# Patient Record
Sex: FEMALE | Race: WHITE | URBAN - METROPOLITAN AREA
[De-identification: names, ages, dates, MRNs, and addresses within clinical notes are randomized per-mention and may not be internally consistent; named-entity substitution may affect disease eponyms.]

---

## 2024-03-24 LAB
AMB EXT HIV AG AB COMBO: NEGATIVE
AMB EXT TSH: 1.23 MIU/ML

## 2024-06-05 ENCOUNTER — OFFICE VISIT (OUTPATIENT)
Dept: OBGYN CLINIC | Facility: CLINIC | Age: 34
End: 2024-06-05

## 2024-06-05 DIAGNOSIS — Z71.89 PRENATAL CONSULT: Primary | ICD-10-CM

## 2024-06-05 PROBLEM — O26.892 HEARTBURN DURING PREGNANCY IN SECOND TRIMESTER (HCC): Status: ACTIVE | Noted: 2024-06-05

## 2024-06-05 PROBLEM — K59.01 SLOW TRANSIT CONSTIPATION: Status: ACTIVE | Noted: 2024-06-05

## 2024-06-05 PROBLEM — R12 HEARTBURN DURING PREGNANCY IN SECOND TRIMESTER (HCC): Status: ACTIVE | Noted: 2024-06-05

## 2024-06-05 PROBLEM — Z87.59 HISTORY OF NEONATAL DEATH: Status: ACTIVE | Noted: 2024-06-05

## 2024-06-05 RX ORDER — DOCUSATE SODIUM 100 MG/1
100 CAPSULE, LIQUID FILLED ORAL 2 TIMES DAILY
Qty: 60 CAPSULE | Refills: 0 | Status: SHIPPED | OUTPATIENT
Start: 2024-06-05 | End: 2024-07-05

## 2024-06-05 RX ORDER — FAMOTIDINE 20 MG/1
20 TABLET, FILM COATED ORAL 2 TIMES DAILY PRN
Qty: 60 TABLET | Refills: 0 | Status: SHIPPED | OUTPATIENT
Start: 2024-06-05 | End: 2024-07-05

## 2024-06-11 NOTE — PROGRESS NOTES
Here for meet and greet. Has some records. Previous vaginal delivery x2 2nd was  demise at 10 days of life. +FHts today. Ordered medication for heartburn and constipation. Needs RN visit and CNM visit to follow.

## 2024-06-19 ENCOUNTER — ROUTINE PRENATAL (OUTPATIENT)
Dept: OBGYN CLINIC | Facility: CLINIC | Age: 34
End: 2024-06-19

## 2024-06-19 VITALS — DIASTOLIC BLOOD PRESSURE: 87 MMHG | SYSTOLIC BLOOD PRESSURE: 122 MMHG | HEART RATE: 120 BPM | WEIGHT: 176 LBS

## 2024-06-19 DIAGNOSIS — J06.9 URI, ACUTE: ICD-10-CM

## 2024-06-19 DIAGNOSIS — O09.529 SUPERVISION OF HIGH-RISK PREGNANCY OF ELDERLY MULTIGRAVIDA (HCC): Primary | ICD-10-CM

## 2024-06-19 PROCEDURE — 99213 OFFICE O/P EST LOW 20 MIN: CPT | Performed by: ADVANCED PRACTICE MIDWIFE

## 2024-06-19 NOTE — PROGRESS NOTES
Subjective:   Patient ID: Xu Martinez is a 34 year old female.    Has had nasal congestion and cold all week, no fevers. Not taking any OTC meds. Has not had RN ed visit. Active baby no leaking no bleeding.         History/Other:   Review of Systems   Constitutional:  Positive for fatigue. Negative for chills and fever.   HENT:  Positive for congestion, postnasal drip, sinus pressure, sinus pain and sore throat.    Cardiovascular: Negative.    Gastrointestinal: Negative.    Genitourinary: Negative.    Musculoskeletal: Negative.    Skin: Negative.    Psychiatric/Behavioral: Negative.       Current Outpatient Medications   Medication Sig Dispense Refill    famotidine (PEPCID) 20 MG Oral Tab Take 1 tablet (20 mg total) by mouth 2 (two) times daily as needed for Heartburn. 60 tablet 0    docusate sodium 100 MG Oral Cap Take 1 capsule (100 mg total) by mouth 2 (two) times daily. 60 capsule 0     Allergies:Not on File    Objective:   Physical Exam  Constitutional:       Appearance: Normal appearance. She is normal weight.   HENT:      Head: Normocephalic and atraumatic.      Nose: Congestion and rhinorrhea present.      Mouth/Throat:      Mouth: Mucous membranes are moist.      Pharynx: Posterior oropharyngeal erythema present.   Cardiovascular:      Rate and Rhythm: Normal rate and regular rhythm.   Pulmonary:      Effort: Pulmonary effort is normal.   Musculoskeletal:         General: Normal range of motion.      Cervical back: Normal range of motion and neck supple. No tenderness.   Lymphadenopathy:      Cervical: No cervical adenopathy.   Skin:     General: Skin is warm and dry.   Neurological:      General: No focal deficit present.      Mental Status: She is alert and oriented to person, place, and time. Mental status is at baseline.     GRAVID FH 27 FHTS 148    Assessment & Plan:   1. Supervision of high-risk pregnancy of elderly multigravida (HCC)    2. URI, acute        No orders of the defined types were  placed in this encounter.      Meds This Visit:  Requested Prescriptions      No prescriptions requested or ordered in this encounter       Imaging & Referrals:  MATERNAL FETAL MEDICINE - INTERNAL

## 2024-06-21 ENCOUNTER — NURSE ONLY (OUTPATIENT)
Dept: OBGYN CLINIC | Facility: CLINIC | Age: 34
End: 2024-06-21

## 2024-06-21 VITALS — BODY MASS INDEX: 30 KG/M2 | HEIGHT: 64 IN

## 2024-06-21 DIAGNOSIS — Z34.82 ENCOUNTER FOR SUPERVISION OF OTHER NORMAL PREGNANCY IN SECOND TRIMESTER (HCC): Primary | ICD-10-CM

## 2024-06-21 RX ORDER — CHOLECALCIFEROL (VITAMIN D3) 25 MCG
1 TABLET,CHEWABLE ORAL DAILY
COMMUNITY

## 2024-06-21 NOTE — PROGRESS NOTES
Pt currently 28w 4d gestation transfer of care  from [place]. Some PN records received and already scanned into chart.     Pt is a  with EDC of 2024 based on LMP-23 and first trimester ultrasound done in [place] on 2024..     Med Hx-AMA                Obesity                 Pt denies any other health issues.     OB Hx- full term  live male               full term  live male. Infant passed 10 days after delivery.       Telephone OB RN Education visit done with pt and her . Education materials reviewed with pt including, but not limited to: plan of care, safe medications, guidance on nutrition, travel, food safety, when to call the MD,ect.     Pt agreeable to blood transfusion if needed.     First trimester prenatal labs, B12, TSH, ordered for pt.     Pt counseled on the availability of genetic screenings including: cell free DNA, FTS w/US,Quad screen, MSAFP, and CF screening. Pt and  decline at this time.     Media policy for [facility] reviewed with pt.    EPDS score for today-0    Epidural-not sure    Circumcision-not sure    Feeding-both    Transfusion-yes    How pt heard about the midwives office-internet. Pt looking for group of female doctors.    New OB visit on 2024.

## 2024-06-22 ENCOUNTER — LAB ENCOUNTER (OUTPATIENT)
Dept: LAB | Age: 34
End: 2024-06-22
Attending: ADVANCED PRACTICE MIDWIFE

## 2024-06-22 DIAGNOSIS — Z34.82 ENCOUNTER FOR SUPERVISION OF OTHER NORMAL PREGNANCY IN SECOND TRIMESTER (HCC): ICD-10-CM

## 2024-06-22 DIAGNOSIS — O99.013 ANEMIA IN PREGNANCY, THIRD TRIMESTER (HCC): ICD-10-CM

## 2024-06-22 LAB
ANTIBODY SCREEN: NEGATIVE
BASOPHILS # BLD AUTO: 0.03 X10(3) UL (ref 0–0.2)
BASOPHILS NFR BLD AUTO: 0.3 %
DEPRECATED RDW RBC AUTO: 40 FL (ref 35.1–46.3)
EOSINOPHIL # BLD AUTO: 0.53 X10(3) UL (ref 0–0.7)
EOSINOPHIL NFR BLD AUTO: 5.1 %
ERYTHROCYTE [DISTWIDTH] IN BLOOD BY AUTOMATED COUNT: 14 % (ref 11–15)
EST. AVERAGE GLUCOSE BLD GHB EST-MCNC: 117 MG/DL (ref 68–126)
HBA1C MFR BLD: 5.7 % (ref ?–5.7)
HBV SURFACE AG SER-ACNC: <0.1 [IU]/L
HBV SURFACE AG SERPL QL IA: NONREACTIVE
HCT VFR BLD AUTO: 34.2 %
HCV AB SERPL QL IA: NONREACTIVE
HGB BLD-MCNC: 10.7 G/DL
IMM GRANULOCYTES # BLD AUTO: 0.05 X10(3) UL (ref 0–1)
IMM GRANULOCYTES NFR BLD: 0.5 %
LYMPHOCYTES # BLD AUTO: 2.37 X10(3) UL (ref 1–4)
LYMPHOCYTES NFR BLD AUTO: 22.7 %
MCH RBC QN AUTO: 24.5 PG (ref 26–34)
MCHC RBC AUTO-ENTMCNC: 31.3 G/DL (ref 31–37)
MCV RBC AUTO: 78.4 FL
MONOCYTES # BLD AUTO: 0.61 X10(3) UL (ref 0.1–1)
MONOCYTES NFR BLD AUTO: 5.8 %
NEUTROPHILS # BLD AUTO: 6.87 X10 (3) UL (ref 1.5–7.7)
NEUTROPHILS # BLD AUTO: 6.87 X10(3) UL (ref 1.5–7.7)
NEUTROPHILS NFR BLD AUTO: 65.6 %
PLATELET # BLD AUTO: 332 10(3)UL (ref 150–450)
RBC # BLD AUTO: 4.36 X10(6)UL
RH BLOOD TYPE: POSITIVE
RUBV IGG SER QL: POSITIVE
RUBV IGG SER-ACNC: 45.5 IU/ML (ref 10–?)
T PALLIDUM AB SER QL IA: NONREACTIVE
TSI SER-ACNC: 1.27 MIU/ML (ref 0.55–4.78)
VIT B12 SERPL-MCNC: 331 PG/ML (ref 211–911)
WBC # BLD AUTO: 10.5 X10(3) UL (ref 4–11)

## 2024-06-22 PROCEDURE — 87389 HIV-1 AG W/HIV-1&-2 AB AG IA: CPT

## 2024-06-22 PROCEDURE — 87340 HEPATITIS B SURFACE AG IA: CPT

## 2024-06-22 PROCEDURE — 87086 URINE CULTURE/COLONY COUNT: CPT

## 2024-06-22 PROCEDURE — 83021 HEMOGLOBIN CHROMOTOGRAPHY: CPT

## 2024-06-22 PROCEDURE — 36415 COLL VENOUS BLD VENIPUNCTURE: CPT

## 2024-06-22 PROCEDURE — 82728 ASSAY OF FERRITIN: CPT

## 2024-06-22 PROCEDURE — 86803 HEPATITIS C AB TEST: CPT

## 2024-06-22 PROCEDURE — 86900 BLOOD TYPING SEROLOGIC ABO: CPT

## 2024-06-22 PROCEDURE — 86787 VARICELLA-ZOSTER ANTIBODY: CPT

## 2024-06-22 PROCEDURE — 86780 TREPONEMA PALLIDUM: CPT

## 2024-06-22 PROCEDURE — 83036 HEMOGLOBIN GLYCOSYLATED A1C: CPT

## 2024-06-22 PROCEDURE — 86901 BLOOD TYPING SEROLOGIC RH(D): CPT

## 2024-06-22 PROCEDURE — 82607 VITAMIN B-12: CPT

## 2024-06-22 PROCEDURE — 86850 RBC ANTIBODY SCREEN: CPT

## 2024-06-22 PROCEDURE — 84443 ASSAY THYROID STIM HORMONE: CPT

## 2024-06-22 PROCEDURE — 86762 RUBELLA ANTIBODY: CPT

## 2024-06-22 PROCEDURE — 83020 HEMOGLOBIN ELECTROPHORESIS: CPT

## 2024-06-22 PROCEDURE — 85025 COMPLETE CBC W/AUTO DIFF WBC: CPT

## 2024-06-24 LAB — VZV IGG SER IA-ACNC: 249.6 (ref 165–?)

## 2024-06-26 ENCOUNTER — TELEPHONE (OUTPATIENT)
Dept: OBGYN CLINIC | Facility: CLINIC | Age: 34
End: 2024-06-26

## 2024-06-26 DIAGNOSIS — R73.09 ELEVATED HEMOGLOBIN A1C: ICD-10-CM

## 2024-06-26 DIAGNOSIS — O99.013 ANEMIA IN PREGNANCY, THIRD TRIMESTER (HCC): Primary | ICD-10-CM

## 2024-06-26 PROBLEM — R73.03 PREDIABETES: Status: ACTIVE | Noted: 2024-06-26

## 2024-06-26 LAB
HGB A2 MFR BLD: 2.5 % (ref 1.5–3.5)
HGB PNL BLD ELPH: 97.5 % (ref 95.5–100)

## 2024-06-26 NOTE — TELEPHONE ENCOUNTER
Mild anemia, supplement per protocol. Also hgB A1C elevated in pre-diabetic range. Needs to do 3hr. Please notify and order. Rest of labs normal.

## 2024-06-27 LAB — DEPRECATED HBV CORE AB SER IA-ACNC: 11 NG/ML

## 2024-06-28 RX ORDER — FERROUS SULFATE 325(65) MG
325 TABLET ORAL EVERY OTHER DAY
Qty: 30 TABLET | Refills: 1 | Status: SHIPPED | OUTPATIENT
Start: 2024-06-28

## 2024-06-28 NOTE — TELEPHONE ENCOUNTER
Pt Name and  verified.  Patient informed and verbalized understanding.  Script for iron supplement sent to pharmacy.

## 2024-06-29 ENCOUNTER — LAB ENCOUNTER (OUTPATIENT)
Dept: LAB | Age: 34
End: 2024-06-29
Attending: ADVANCED PRACTICE MIDWIFE
Payer: MEDICAID

## 2024-06-29 DIAGNOSIS — R73.09 ELEVATED HEMOGLOBIN A1C: ICD-10-CM

## 2024-06-29 LAB
GLUCOSE 1H P GLC SERPL-MCNC: 166 MG/DL
GLUCOSE 2H P GLC SERPL-MCNC: 140 MG/DL
GLUCOSE 3H P GLC SERPL-MCNC: 117 MG/DL (ref 70–140)
GLUCOSE P FAST SERPL-MCNC: 80 MG/DL

## 2024-06-29 PROCEDURE — 36415 COLL VENOUS BLD VENIPUNCTURE: CPT

## 2024-06-29 PROCEDURE — 82952 GTT-ADDED SAMPLES: CPT

## 2024-06-29 PROCEDURE — 82951 GLUCOSE TOLERANCE TEST (GTT): CPT

## 2024-07-01 PROBLEM — O09.529 AMA (ADVANCED MATERNAL AGE) MULTIGRAVIDA 35+ (HCC): Status: ACTIVE | Noted: 2024-07-01

## 2024-07-01 NOTE — PROGRESS NOTES
Xu Martinez is a 34 year old  pt at 30w1d here for MIGUEL  She is feeling good  Not Taking iron every other day. Took for only past 3 days.  Heartburn is OK. Not Taking pepcid  Constipation is OK Not Taking colace.  Pt's uncle and  present.  Uncle with many questions about iron supplements    ROS:  Denies cramping, bleeding, leaking of fluid.  Fetus is active.    Vitals:    24 0957   BP: 118/84   Pulse: 99   Weight: 178 lb 4.8 oz (80.9 kg)      See flowsheet  TW lbs  (15-25 lbs TWG)  HgbA1C 5.7, 3 hr WNL  3T Hgb 10.7      Assessment/Plan:  IUP at 30w1d  1. Prenatal care in third trimester ([facility])    2. Anemia in pregnancy, third trimester ([facility])  - CBC, Platelet; No Differential; Future    3. Prediabetes    4. History of  death    5. Heartburn during pregnancy in second trimester ([facility])    6. Slow transit constipation    7. Multigravida of advanced maternal age in third trimester ([facility])       Reviewed:  Recommendations and rationale for Tdap vaccine(s) in pregnancy- pt desires today  /Labor precautions  Kick counts  Danger Signs/PreE s/s  [facility] 24  RTC 2 wk(s), repeat CBC then    I have spent 20 minutes total time on the day of the encounter, including: preparing to see the patient, ordering/reviewing labs, performing a medically appropriate exam, and providing care coordination. face to face counseling, chart review, orders and coordination of care     Pt verbalized understanding.  All questions answered.  No barriers to learning identified

## 2024-07-02 ENCOUNTER — ROUTINE PRENATAL (OUTPATIENT)
Dept: OBGYN CLINIC | Facility: CLINIC | Age: 34
End: 2024-07-02

## 2024-07-02 VITALS
HEART RATE: 99 BPM | DIASTOLIC BLOOD PRESSURE: 84 MMHG | WEIGHT: 178.31 LBS | BODY MASS INDEX: 31 KG/M2 | SYSTOLIC BLOOD PRESSURE: 118 MMHG

## 2024-07-02 DIAGNOSIS — O99.013 ANEMIA IN PREGNANCY, THIRD TRIMESTER (HCC): ICD-10-CM

## 2024-07-02 DIAGNOSIS — O26.892 HEARTBURN DURING PREGNANCY IN SECOND TRIMESTER (HCC): ICD-10-CM

## 2024-07-02 DIAGNOSIS — O09.523 MULTIGRAVIDA OF ADVANCED MATERNAL AGE IN THIRD TRIMESTER (HCC): ICD-10-CM

## 2024-07-02 DIAGNOSIS — R73.03 PREDIABETES: ICD-10-CM

## 2024-07-02 DIAGNOSIS — K59.01 SLOW TRANSIT CONSTIPATION: ICD-10-CM

## 2024-07-02 DIAGNOSIS — R12 HEARTBURN DURING PREGNANCY IN SECOND TRIMESTER (HCC): ICD-10-CM

## 2024-07-02 DIAGNOSIS — Z34.93 PRENATAL CARE IN THIRD TRIMESTER (HCC): Primary | ICD-10-CM

## 2024-07-02 DIAGNOSIS — Z87.59 HISTORY OF NEONATAL DEATH: ICD-10-CM

## 2024-07-02 PROCEDURE — 90471 IMMUNIZATION ADMIN: CPT | Performed by: ADVANCED PRACTICE MIDWIFE

## 2024-07-02 PROCEDURE — 90715 TDAP VACCINE 7 YRS/> IM: CPT | Performed by: ADVANCED PRACTICE MIDWIFE

## 2024-07-02 PROCEDURE — 99212 OFFICE O/P EST SF 10 MIN: CPT | Performed by: ADVANCED PRACTICE MIDWIFE

## 2024-07-18 ENCOUNTER — HOSPITAL ENCOUNTER (OUTPATIENT)
Facility: HOSPITAL | Age: 34
Discharge: HOME | End: 2024-07-18
Attending: ADVANCED PRACTICE MIDWIFE | Admitting: OBSTETRICS & GYNECOLOGY
Payer: MEDICAID

## 2024-07-18 ENCOUNTER — ROUTINE PRENATAL (OUTPATIENT)
Dept: OBGYN CLINIC | Facility: CLINIC | Age: 34
End: 2024-07-18

## 2024-07-18 VITALS — HEART RATE: 67 BPM | DIASTOLIC BLOOD PRESSURE: 80 MMHG | SYSTOLIC BLOOD PRESSURE: 125 MMHG

## 2024-07-18 VITALS
DIASTOLIC BLOOD PRESSURE: 89 MMHG | WEIGHT: 179 LBS | SYSTOLIC BLOOD PRESSURE: 131 MMHG | HEART RATE: 101 BPM | BODY MASS INDEX: 31 KG/M2

## 2024-07-18 DIAGNOSIS — M54.9 BACK PAIN IN PREGNANCY (HCC): ICD-10-CM

## 2024-07-18 DIAGNOSIS — O26.893 ABDOMINAL PAIN DURING PREGNANCY IN THIRD TRIMESTER (HCC): ICD-10-CM

## 2024-07-18 DIAGNOSIS — Z34.93 PRENATAL CARE IN THIRD TRIMESTER (HCC): Primary | ICD-10-CM

## 2024-07-18 DIAGNOSIS — R10.9 ABDOMINAL PAIN DURING PREGNANCY IN THIRD TRIMESTER (HCC): ICD-10-CM

## 2024-07-18 DIAGNOSIS — O99.891 BACK PAIN IN PREGNANCY (HCC): ICD-10-CM

## 2024-07-18 LAB
BILIRUB UR QL: NEGATIVE
COLOR UR: YELLOW
FIBRONECTIN FETAL SPEC QL: POSITIVE
GLUCOSE UR-MCNC: NORMAL MG/DL
KETONES UR-MCNC: NEGATIVE MG/DL
LEUKOCYTE ESTERASE UR QL STRIP.AUTO: 500
NITRITE UR QL STRIP.AUTO: NEGATIVE
PH UR: 6 [PH] (ref 5–8)
PROT UR-MCNC: 30 MG/DL
RUPTURE OF MEMBRANE (ROM): NEGATIVE
SP GR UR STRIP: 1.02 (ref 1–1.03)
UROBILINOGEN UR STRIP-ACNC: NORMAL

## 2024-07-18 PROCEDURE — 59025 FETAL NON-STRESS TEST: CPT | Performed by: ADVANCED PRACTICE MIDWIFE

## 2024-07-18 NOTE — PROGRESS NOTES
Endorses regular fetal movement. Reports since yesterday she has had pelvic, back and lower abdominal pain. Pain is coming and going. Unsure if fluid is leaking. Denies vaginal bleeding.     Instructed them to proceed to L&D for further evaluation. Triage RN and CNM on call notified. CNM on call will be going into triage to evaluate.    Plan:  To triage for now  MIGUEL 2 weeks

## 2024-07-18 NOTE — TRIAGE
[facility]  part of [facility]      Triage Note    Xu Martinez Patient Status:  Outpatient    1990 MRN [id number]   Location [facility] Attending Lindy Berg CNM   Hosp Day # 0 PCP No primary care provider on file.      Para:   Estimated Date of Delivery: 24  Gestation: 32w3d    Chief Complaint    R/o  Labor; R/o Rom         Allergies:  No Known Allergies    Orders Placed This Encounter   Procedures    Urinalysis with Culture Reflex    FETAL FIBRONECTIN    Rupture of Membrane (ROM),Protein Markers    Chlamydia/Gc Amplification    Group B Strep PCR    Vaginitis Vaginosis PCR Panel    Urine Culture, Routine       Lab Results   Component Value Date    WBC 10.5 2024    HGB 10.7 (L) 2024    HCT 34.2 (L) 2024    .0 2024    TSH 1.272 2024    B12 331 2024    FFN Positive (A) 2024       Clinitek UA  Lab Results   Component Value Date    GLUUR Normal 2024    SPECGRAVITY 1.019 2024    URINECUL  2024     10-50,000 cfu/ml Multiple species present-probable contamination.       UA  Lab Results   Component Value Date    COLORUR Yellow 2024    CLARITY Turbid (A) 2024    SPECGRAVITY 1.019 2024    PROUR 30 (A) 2024    GLUUR Normal 2024    KETUR Negative 2024    BILUR Negative 2024    BLOODURINE Trace (A) 2024    NITRITE Negative 2024    UROBILINOGEN Normal 2024    LEUUR 500 (A) 2024       Vitals:    24 1315   BP: 125/80   Pulse: 67       NST                                                                                                                                                         Additional Comments       Chief Complaint   Patient presents with    R/o  Labor    R/o Rom       S: Pt was seen in office today as an add-on because she called with back and lower abdominal pain. She also was not sure if  her bag of gomez was open. She was sent to Triage for evaluation. Here in Triage she reports intermittent back, lower abdominal, and inner thigh pains that are coming and going, some stronger/more painful than others. She reports the baby is moving well. She denies vaginal bleeding and has not had intercourse in more than 3 days. She is not sure if she is having leaking fluid. She is accompanied by her  who is supportive. They are concerned because, while she had two full term vaginal births before, her second child passed at 10 days of life from uncertain cause. Their anxiety is heightened.    O: /80   Pulse 67   LMP 2023 (Exact Date)   Fetal Heart Tones (FHTs): 145 with moderate variability, accelerations present, decelerations absent  Uterine contractions (Ucs): q5-19 minutes, palpable as mild  Speculum exam: White/yellowish thick discharge, no pooling, cervix appears closed and thick  Sterile Vaginal Exam (SVE): closed/long/high  AmniTest negative  Ferning negative  fFN positive  RomPlus negative  UA probable UTI - sent for culture  Pending labs: vaginal culture, GC/CT    A/P: 33yo  at 32w3d, damir  Category 1 FHR tracing  Probable UTI: Rocephin 1g IM given in Triage and pt sent home on Keflex 500mg PO BID x7 days  PTL precautions/kick counts  Return to office 1 week  Discussed with Dr Morales who agrees with ines Berg CNM  2024 1:48 PM

## 2024-07-18 NOTE — PROGRESS NOTES
Pt is a 34 year old female admitted to TR1/TR1-A.     Chief Complaint   Patient presents with    R/o  Labor    R/o Rom      Pt is  32w3d intra-uterine pregnancy.  History obtained, consents signed. Oriented to room, staff, and plan of care.

## 2024-07-18 NOTE — TRIAGE
[facility]  part of [facility]      Triage Note    Xu Martinez Patient Status:  Outpatient    1990 MRN [id number]   Location [facility] Attending Lindy Berg CNM   Hosp Day # 0 PCP No primary care provider on file.      Para:   Estimated Date of Delivery: 24  Gestation: 32w3d    Chief Complaint    R/o  Labor; R/o Rom         Allergies:  No Known Allergies    Orders Placed This Encounter   Procedures    Urinalysis with Culture Reflex    FETAL FIBRONECTIN    Rupture of Membrane (ROM),Protein Markers    Chlamydia/Gc Amplification    Group B Strep PCR    Vaginitis Vaginosis PCR Panel    Urine Culture, Routine       Lab Results   Component Value Date    WBC 10.5 2024    HGB 10.7 (L) 2024    HCT 34.2 (L) 2024    .0 2024    TSH 1.272 2024    B12 331 2024    FFN Positive (A) 2024       Clinitek UA  Lab Results   Component Value Date    GLUUR Normal 2024    SPECGRAVITY 1.019 2024    URINECUL  2024     10-50,000 cfu/ml Multiple species present-probable contamination.       UA  Lab Results   Component Value Date    COLORUR Yellow 2024    CLARITY Turbid (A) 2024    SPECGRAVITY 1.019 2024    PROUR 30 (A) 2024    GLUUR Normal 2024    KETUR Negative 2024    BILUR Negative 2024    BLOODURINE Trace (A) 2024    NITRITE Negative 2024    UROBILINOGEN Normal 2024    LEUUR 500 (A) 2024       Vitals:    24 1315   BP: 125/80   Pulse: 67       NST  Variability: Moderate           Accelerations: Yes           Decelerations: None            Baseline: 135 BPM           Uterine Irritability: Yes           Contractions: Irregular                                        Acoustic Stimulator: No           Nonstress Test Interpretation: Reactive           Nonstress Test Second Interpretation: Reactive          FHR Category:  Category I             Additional Comments Comments:  32 3/7 sent from office for back and abd pain along with questionable leaking of fluid. SAE Miguel CNM assessed pt. SVE 0/0/-5. UA, rom plus, FFN sent and resulted. sae miguel notified of all results. Pt given one time IM dose of abx and sent home with oral abx per CNM.     Chief Complaint   Patient presents with    R/o  Labor    R/o Rom         Guerline QUEEVDO RN  2024 3:51 PM    Physician Evaluation      NST Interpretation: Reactive    Disposition:   Discharged    Comments:    Continue with current management plan.      Lindy Miguel CNM

## 2024-07-19 ENCOUNTER — TELEPHONE (OUTPATIENT)
Dept: OBGYN CLINIC | Facility: CLINIC | Age: 34
End: 2024-07-19

## 2024-07-19 ENCOUNTER — MOBILE ENCOUNTER (OUTPATIENT)
Dept: OBGYN CLINIC | Facility: CLINIC | Age: 34
End: 2024-07-19

## 2024-07-19 DIAGNOSIS — O99.013 ANEMIA IN PREGNANCY, THIRD TRIMESTER (HCC): Primary | ICD-10-CM

## 2024-07-19 PROBLEM — O47.00 PRETERM CONTRACTIONS (HCC): Status: ACTIVE | Noted: 2024-07-19

## 2024-07-19 LAB
C TRACH DNA SPEC QL NAA+PROBE: NEGATIVE
GROUP B STREP BY PCR FOR PCR OVT: NEGATIVE
N GONORRHOEA DNA SPEC QL NAA+PROBE: NEGATIVE

## 2024-07-19 RX ORDER — CEPHALEXIN 500 MG/1
500 CAPSULE ORAL 4 TIMES DAILY
Qty: 40 CAPSULE | Refills: 0 | Status: SHIPPED | OUTPATIENT
Start: 2024-07-19 | End: 2024-07-19

## 2024-07-19 RX ORDER — CEPHALEXIN 500 MG/1
500 CAPSULE ORAL 4 TIMES DAILY
Qty: 40 CAPSULE | Refills: 0 | Status: SHIPPED
Start: 2024-07-19 | End: 2024-07-29

## 2024-07-19 NOTE — TELEPHONE ENCOUNTER
Patient's friend calling on her behalf (release is on file). She was seen at the [facility] yesterday and upon leaving was told that medications were being prescribed. Nothing has been sent to her pharmacy.     Also wondering if recently ordered lab work is still needed. Please advise.

## 2024-07-19 NOTE — TELEPHONE ENCOUNTER
Friend to follow up on prescription that was to be sent yesterday to [facility] that to be started today. Please call as soon as possible.

## 2024-07-22 ENCOUNTER — TELEPHONE (OUTPATIENT)
Dept: OBGYN CLINIC | Facility: CLINIC | Age: 34
End: 2024-07-22

## 2024-07-22 ENCOUNTER — LAB ENCOUNTER (OUTPATIENT)
Dept: LAB | Age: 34
End: 2024-07-22
Attending: ADVANCED PRACTICE MIDWIFE
Payer: MEDICAID

## 2024-07-22 DIAGNOSIS — O99.013 ANEMIA IN PREGNANCY, THIRD TRIMESTER (HCC): ICD-10-CM

## 2024-07-22 LAB
DEPRECATED RDW RBC AUTO: 40.5 FL (ref 35.1–46.3)
ERYTHROCYTE [DISTWIDTH] IN BLOOD BY AUTOMATED COUNT: 14.9 % (ref 11–15)
HCT VFR BLD AUTO: 35.5 %
HGB BLD-MCNC: 11.4 G/DL
MCH RBC QN AUTO: 24.4 PG (ref 26–34)
MCHC RBC AUTO-ENTMCNC: 32.1 G/DL (ref 31–37)
MCV RBC AUTO: 76 FL
PLATELET # BLD AUTO: 296 10(3)UL (ref 150–450)
RBC # BLD AUTO: 4.67 X10(6)UL
WBC # BLD AUTO: 10 X10(3) UL (ref 4–11)

## 2024-07-22 PROCEDURE — 85027 COMPLETE CBC AUTOMATED: CPT

## 2024-07-22 PROCEDURE — 36415 COLL VENOUS BLD VENIPUNCTURE: CPT

## 2024-07-22 NOTE — TELEPHONE ENCOUNTER
----- Message from Lindy Berg sent at 7/19/2024 12:30 PM CDT -----  Pt does not have active MyChart. Please call her to notify her of negative GC/CT. She will have other labs flowing in so if you want to wait a few days before calling her and inform her of all of the results at one time that is fine.      Vaginal culture inconclusive but not concerning at this time.  GBS negative.  Please notify patient of lab results by phone. No active MyChart.

## 2024-07-24 ENCOUNTER — ROUTINE PRENATAL (OUTPATIENT)
Dept: OBGYN CLINIC | Facility: CLINIC | Age: 34
End: 2024-07-24

## 2024-07-24 VITALS
HEART RATE: 86 BPM | DIASTOLIC BLOOD PRESSURE: 90 MMHG | SYSTOLIC BLOOD PRESSURE: 123 MMHG | BODY MASS INDEX: 31 KG/M2 | WEIGHT: 181 LBS

## 2024-07-24 DIAGNOSIS — R03.0 ELEVATED BLOOD PRESSURE READING IN OFFICE WITHOUT DIAGNOSIS OF HYPERTENSION: Primary | ICD-10-CM

## 2024-07-24 DIAGNOSIS — B37.31 YEAST VAGINITIS: ICD-10-CM

## 2024-07-24 DIAGNOSIS — R39.9 UTI SYMPTOMS: ICD-10-CM

## 2024-07-24 LAB
APPEARANCE: CLEAR
BILIRUBIN: NEGATIVE
GLUCOSE (URINE DIPSTICK): NEGATIVE MG/DL
KETONES (URINE DIPSTICK): NEGATIVE MG/DL
MULTISTIX LOT#: ABNORMAL NUMERIC
NITRITE, URINE: NEGATIVE
PH, URINE: 6 (ref 4.5–8)
PROTEIN (URINE DIPSTICK): 30 MG/DL
SPECIFIC GRAVITY: 1.02 (ref 1–1.03)
URINE-COLOR: YELLOW
UROBILINOGEN,SEMI-QN: 0.2 MG/DL (ref 0–1.9)

## 2024-07-24 PROCEDURE — 81002 URINALYSIS NONAUTO W/O SCOPE: CPT | Performed by: ADVANCED PRACTICE MIDWIFE

## 2024-07-24 PROCEDURE — 99213 OFFICE O/P EST LOW 20 MIN: CPT | Performed by: ADVANCED PRACTICE MIDWIFE

## 2024-07-24 NOTE — PROGRESS NOTES
Has been taking blood pressure at home today a little elevated.   Chief Complaint   Patient presents with    Prenatal Care     Pt states medication is making her sick. Pt states she is also itchy down there with burning in her chest.      Was started on keflex 500 QID x 10 days after triage visit. +FFN;  Urine culture resulted negative. Received 1 IM dose of rocephin in hospital. No leaking no bleeding. Baby is active. No headaches no vision changes. Reduce IRON supplement to once weekly as HGB increased to 11.4; has growth ultrasound tomorrow. Started on mychart blood pressure flowsheet. Urine culture sent today. Vaginal yeast treatment to pharmacy.

## 2024-07-25 ENCOUNTER — HOSPITAL ENCOUNTER (OUTPATIENT)
Dept: PERINATAL CARE | Facility: HOSPITAL | Age: 34
Discharge: HOME | End: 2024-07-25
Attending: OBSTETRICS & GYNECOLOGY
Payer: MEDICAID

## 2024-07-25 ENCOUNTER — HOSPITAL ENCOUNTER (OUTPATIENT)
Dept: PERINATAL CARE | Facility: HOSPITAL | Age: 34
Discharge: HOME | End: 2024-07-25
Attending: ADVANCED PRACTICE MIDWIFE
Payer: MEDICAID

## 2024-07-25 VITALS
BODY MASS INDEX: 31 KG/M2 | DIASTOLIC BLOOD PRESSURE: 87 MMHG | SYSTOLIC BLOOD PRESSURE: 138 MMHG | HEART RATE: 76 BPM | WEIGHT: 179 LBS

## 2024-07-25 DIAGNOSIS — Z87.59 HISTORY OF NEONATAL DEATH: ICD-10-CM

## 2024-07-25 DIAGNOSIS — O47.00 PRETERM CONTRACTIONS (HCC): ICD-10-CM

## 2024-07-25 DIAGNOSIS — R73.03 PREDIABETES: ICD-10-CM

## 2024-07-25 DIAGNOSIS — O09.522 MULTIGRAVIDA OF ADVANCED MATERNAL AGE IN SECOND TRIMESTER (HCC): Primary | ICD-10-CM

## 2024-07-25 DIAGNOSIS — O09.529 SUPERVISION OF HIGH-RISK PREGNANCY OF ELDERLY MULTIGRAVIDA (HCC): ICD-10-CM

## 2024-07-25 DIAGNOSIS — O09.522 MULTIGRAVIDA OF ADVANCED MATERNAL AGE IN SECOND TRIMESTER (HCC): ICD-10-CM

## 2024-07-25 PROCEDURE — 76819 FETAL BIOPHYS PROFIL W/O NST: CPT

## 2024-07-25 PROCEDURE — 76811 OB US DETAILED SNGL FETUS: CPT | Performed by: OBSTETRICS & GYNECOLOGY

## 2024-07-25 NOTE — PROGRESS NOTES
Outpatient Maternal-Fetal Medicine Consultation    Dear Ms. Saul,    Thank you for requesting ultrasound evaluation and maternal fetal medicine consultation on your patient Xu Martinez.  As you are aware she is a 34 year old female with a Modi pregnancy at 33w3d.  A maternal-fetal medicine consultation was requested secondary to AMA and h/o  demise.  Her prenatal records and labs were reviewed.    HISTORY  OB History    Para Term  AB Living   3 2 2 0 0 1   SAB IAB Ectopic Multiple Live Births   0 0 0 0 2     # 1 - Date: 07/10/16, Sex: Male, Weight: 6 lb (2.722 kg), GA: None, Type: Normal spontaneous vaginal delivery, Apgar1: None, Apgar5: None, Living: Living, Birth Comments: None    # 2 - Date: 23, Sex: Male, Weight: None, GA: None, Type: Normal spontaneous vaginal delivery, Apgar1: None, Apgar5: None, Living:  Demise, Birth Comments: [place]    # 3 - Date: None, Sex: None, Weight: None, GA: None, Type: None, Apgar1: None, Apgar5: None, Living: None, Birth Comments: None    Past Medical History  The patient  has no past medical history on file.    Past Surgical History  The patient  has no past surgical history on file.    Family History  The patient has no family status information on file.       Medications:   Current Outpatient Medications:     clotrimazole 2 % Vaginal Cream, Place 1 Applicatorful vaginally nightly for 3 days., Disp: 21 g, Rfl: 0    cephalexin (KEFLEX) 500 MG Oral Cap, Take 1 capsule (500 mg total) by mouth 4 (four) times daily for 10 days., Disp: 40 capsule, Rfl: 0    Ferrous Sulfate 325 (65 Fe) MG Oral Tab, Take 1 tablet (325 mg total) by mouth every other day., Disp: 30 tablet, Rfl: 1    prenatal vitamin with DHA 27-0.8-228 MG Oral Cap, Take 1 capsule by mouth daily., Disp: , Rfl:   Allergies: No Known Allergies      PHYSICAL EXAMINATION:  /87   Pulse 76   Wt 179 lb (81.2 kg)   LMP 2023 (Exact Date)   BMI 30.73 kg/m²   General:  alert and oriented,no acute distress  Abdomen: gravid, soft, non-tender  Extremities: non-tender, no edema        OBSTETRIC ULTRASOUND  The patient had a level 2 & BPP ultrasound today which I interpreted the results and reviewed them with the patient.    Ultrasound findings:     Single IUP in cephalic presentation.    Placenta is posterior, left.   A 3 vessel cord is noted.  Cardiac activity is present at 154 bpm  EFW 1943 g ( 4 lb 5 oz); 27%.  AC 13%.  MVP is 4.4 cm . KARUNA 9.7 cm  BPP is 8/8.     The extremities/RVOT, 4 chamber view and profile are suboptimal secondary to fetal position/late gestational age.     The fetal measurements are consistent with the established EDC. No ultrasound evidence of structural abnormalities are seen today. No ultrasound evidence of markers for aneuploidy are seen. She understands that ultrasound exam cannot exclude genetic abnormalities and that genetic testing is recommended. The limitations of ultrasound were discussed.     See imaging tab for the complete US report.    DISCUSSION  During her visit we discussed and reviewed the following issues:  ADVANCED MATERNAL AGE    Background  I reviewed with the patient that pregnancies in women of advanced maternal age (35 or older at delivery) are associated with elevated risks. Specifically, there is a higher rate of:  Fetal malformations  Preeclampsia  Gestational diabetes  Intrauterine fetal death    As a result, enhanced pregnancy surveillance is advised for these patients including a comprehensive ultrasound to assess for fetal malformations (at 20 weeks) and a third trimester ultrasound assessment for fetal growth (at 32 weeks). In addition, weekly NST's (initiating at 36 weeks gestation for women 35-39 years and at 32 weeks gestation for women 40 years and older) are also advised. Routine obstetric care is more than adequate to assess for gestational diabetes and preeclampsia; hence, no further significant alterations in  obstetric care are advised.    Medical Complications    Women 35 years of age or older can expect to experience two to three fold higher rates of hospitalization,  delivery, and pregnancy-related complications when compared to their younger counterparts.  The two most common medical problems complicating these  pregnanccies are hypertension and diabetes.   The incidence of preeclampsia in the general obstetric population is 3 to 4 percent; this increases to 5 to 10 percent in women over age 40 and is as high as 35 percent in women over age 50.   The incidence of gestational diabetes in the general obstetric population is 3 percent, rising to 7 to 12 percent in women over age 40 and 20 percent in women over age 50.  Women 35 years of age or older are more likely to be delivered by . The  delivery rate in the general obstetric population of the [place] is almost 30 percent, compared to almost 50 percent in women over age 40 to 45 and almost 80 percent in women age 50 to 63.          Fetal Death        A decision analysis tool using data from the [employer]  Database predicted a strategy of weekly antepartum testing and labor induction would lower the risk of unexplained fetal death in women 35 years of age or older. In this model, weekly testing starting at 36 weeks of gestation would drop the risk of fetal death from 5.2 to 1.3 per 1000 pregnancies. While a policy of antepartum testing in older women does increase the chance that a women will be induced (71 inductions per fetal death averted) and thereby increases her risk of having a  delivery, only 14 additional cesareans would need to be performed to avert one unexplained fetal death.  Hence, weekly NST's are advised for women of advanced maternal age; testing should be initiated at 36 weeks for women 35-39 years and at 32 weeks for women 40 years and older.    Fetal Malformations    Cardiac malformations,  clubfoot, and diaphragmatic hernia appear to occur with increased frequency in offspring of older women. These abnormalities are structural and unrelated to aneuploidy, thus they would not be detected by karyotype analysis.  For these reasons a complete, detailed ultrasound (level II) is advised even if the fetus has a normal karyotype.      Fetal Aneuploidy  We also discussed the increased risk of chromosomal abnormalities associated with advanced maternal age. I reviewed that an ultrasound examination cannot reliably exclude potential genetic abnormalities. Given that the patient will be 34 years old at the time of delivery I reviewed that her risk (at amniocentesis) of having a fetus with any chromosome abnormality is 1:140 and with trisomy 21 is 1: 250.    Invasive Testing  I offered invasive genetic testing (amniocentesis, chorionic villus sampling) after reviewing the diagnostic accuracy of these tests as well as the procedure associated loss rate (1:500 for genetic amniocentesis).      Non-invasive Pregnancy Testing (NIPT)  I reviewed current non-invasive screening options. Currently non-invasive pregnancy testing (NIPT) offers the highest detection rate (with the lowest false positive rate) for the detection of fetal aneuploidy amongst high-risk patients. The limitations of detailed mid-trimester sonography was reviewed with the patient. First trimester screening and second trimester multiple-marker serum serum screening as alternative aneuploidy screening options were also reviewed. However, both of these tests are associated with lower detection and higher false positive rates.    Pre-diabetes -she passed her 3-hour glucose tolerance test.  We discussed healthy eating in pregnancy as well as exercise.  We discussed the current recommendations for limited gestational weight gain in pregnancy for overweight and obese women.  The [place] [employer] currently recommends that women keep gestational weight  gain to between 8-18 lbs.  We discussed the role of mild to moderate exercise, healthy food choices and appropriate portions sized to help achieve this goal.  Excess weight gain is associated with higher rates of gestational diabetes, hypertensive complications, fetal macrosomia and delivery complications.  Women with weight loss or insufficient weight gain have higher rates of small for gestational age infants.    A recent study found that initiating moderate exercise in early pregnancy for obese gravidas significantly reduced the incidence of gestational diabetes, gestational hypertension,  deliveries and C-sections.    I encouraged Xu to begin moderate exercise such as walking or stationary bike in the pregnancy.    H/o  death -2023, she had a term vaginal delivery of her second son.  She and her  say they think is a little over 6 pounds.  They were told everything was normal with her and the baby.  15 days after he was born they were visiting at her father's house and the baby .  They rushed him to the hospital and he was already dead.  A postmortem autopsy was NOT performed.  It was deemed an unexplained sudden infant death.    Since there was no postmortem evaluation, an underlying cause such as congenital heart defect cannot be excluded.  In light of this, I recommended to the patient and her  that before she is discharged from the hospital after this delivery, a  echocardiogram should be performed.    We reviewed the signs and symptoms of preeclampsia,  labor and monitoring fetal movement.  We discussed reasons for her to call her physician.    We discussed the recommended plan of care based on her  risk factors.  Xu and her significant other, Juan, had their questions answered to their satisfaction.      IMPRESSION:  IUP at 33w3d  Normal level 2 ultrasound but some views were limited by advanced gestational age and fetal  position  Advanced maternal age  History of a  death at 15 days of age -unknown cause, postmortem evaluation not performed    RECOMMENDATIONS:  Continue care with this [place]  She was advised to limit total gestational weight gain to less than 20 pounds  Weekly NST at 36 weeks   echocardiogram prior to hospital discharge    Total time spent 55 minutes this calendar day which includes preparing to see the patient including chart review, obtaining and/or reviewing additional medical history, performing a physical exam and evaluation, documenting clinical information in the electronic medical record, independently interpreting results, counseling the patient, communicating results to the patient/family/caregiver and coordinating care.     Case discussed with patient who demonstrated understanding and agreement with plan.     Thank you for allowing me to participate in the care of this patient.  Please feel free to contact me with any questions.    Gail Elam MD  Maternal-Fetal Medicine       Note to patient and family:  The 21st Century Cures Act makes medical notes available to patients in the interest of transparency.  However, please be advised that this is a medical document.  It is intended as a peer to peer communication.  It is written in medical language and may contain abbreviations or verbiage that are technical and unfamiliar.  It may appear blunt or direct.  Medical documents are intended to carry relevant information, facts as evident, and the clinical opinion of the practitioner.

## 2024-07-31 ENCOUNTER — ROUTINE PRENATAL (OUTPATIENT)
Dept: OBGYN CLINIC | Facility: CLINIC | Age: 34
End: 2024-07-31

## 2024-07-31 ENCOUNTER — LAB ENCOUNTER (OUTPATIENT)
Dept: LAB | Age: 34
End: 2024-07-31
Attending: ADVANCED PRACTICE MIDWIFE
Payer: MEDICAID

## 2024-07-31 VITALS
WEIGHT: 183 LBS | SYSTOLIC BLOOD PRESSURE: 125 MMHG | BODY MASS INDEX: 31 KG/M2 | DIASTOLIC BLOOD PRESSURE: 92 MMHG | HEART RATE: 86 BPM

## 2024-07-31 DIAGNOSIS — O09.523 AMA (ADVANCED MATERNAL AGE) MULTIGRAVIDA 35+, THIRD TRIMESTER (HCC): ICD-10-CM

## 2024-07-31 DIAGNOSIS — O09.523 AMA (ADVANCED MATERNAL AGE) MULTIGRAVIDA 35+, THIRD TRIMESTER (HCC): Primary | ICD-10-CM

## 2024-07-31 LAB
ALBUMIN SERPL-MCNC: 3.7 G/DL (ref 3.2–4.8)
ALBUMIN/GLOB SERPL: 1.2 {RATIO} (ref 1–2)
ALP LIVER SERPL-CCNC: 149 U/L
ALT SERPL-CCNC: <7 U/L
ANION GAP SERPL CALC-SCNC: 6 MMOL/L (ref 0–18)
APPEARANCE: CLEAR
AST SERPL-CCNC: 11 U/L (ref ?–34)
BASOPHILS # BLD AUTO: 0.02 X10(3) UL (ref 0–0.2)
BASOPHILS NFR BLD AUTO: 0.2 %
BILIRUB SERPL-MCNC: 0.3 MG/DL (ref 0.3–1.2)
BUN BLD-MCNC: 8 MG/DL (ref 9–23)
BUN/CREAT SERPL: 13.3 (ref 10–20)
CALCIUM BLD-MCNC: 8.9 MG/DL (ref 8.7–10.4)
CHLORIDE SERPL-SCNC: 112 MMOL/L (ref 98–112)
CO2 SERPL-SCNC: 20 MMOL/L (ref 21–32)
CREAT BLD-MCNC: 0.6 MG/DL
DEPRECATED RDW RBC AUTO: 42 FL (ref 35.1–46.3)
EGFRCR SERPLBLD CKD-EPI 2021: 120 ML/MIN/1.73M2 (ref 60–?)
EOSINOPHIL # BLD AUTO: 0.09 X10(3) UL (ref 0–0.7)
EOSINOPHIL NFR BLD AUTO: 0.9 %
ERYTHROCYTE [DISTWIDTH] IN BLOOD BY AUTOMATED COUNT: 15.2 % (ref 11–15)
FASTING STATUS PATIENT QL REPORTED: NO
GLOBULIN PLAS-MCNC: 3 G/DL (ref 2–3.5)
GLUCOSE (URINE DIPSTICK): NEGATIVE MG/DL
GLUCOSE BLD-MCNC: 86 MG/DL (ref 70–99)
HCT VFR BLD AUTO: 37.7 %
HGB BLD-MCNC: 11.6 G/DL
IMM GRANULOCYTES # BLD AUTO: 0.04 X10(3) UL (ref 0–1)
IMM GRANULOCYTES NFR BLD: 0.4 %
KETONES (URINE DIPSTICK): NEGATIVE MG/DL
LYMPHOCYTES # BLD AUTO: 3.16 X10(3) UL (ref 1–4)
LYMPHOCYTES NFR BLD AUTO: 31.3 %
MCH RBC QN AUTO: 23.8 PG (ref 26–34)
MCHC RBC AUTO-ENTMCNC: 30.8 G/DL (ref 31–37)
MCV RBC AUTO: 77.4 FL
MONOCYTES # BLD AUTO: 0.75 X10(3) UL (ref 0.1–1)
MONOCYTES NFR BLD AUTO: 7.4 %
MULTISTIX LOT#: ABNORMAL NUMERIC
NEUTROPHILS # BLD AUTO: 6.02 X10 (3) UL (ref 1.5–7.7)
NEUTROPHILS # BLD AUTO: 6.02 X10(3) UL (ref 1.5–7.7)
NEUTROPHILS NFR BLD AUTO: 59.8 %
NITRITE, URINE: NEGATIVE
OSMOLALITY SERPL CALC.SUM OF ELEC: 284 MOSM/KG (ref 275–295)
PH, URINE: 6 (ref 4.5–8)
PLATELET # BLD AUTO: 288 10(3)UL (ref 150–450)
POTASSIUM SERPL-SCNC: 4.4 MMOL/L (ref 3.5–5.1)
PROT SERPL-MCNC: 6.7 G/DL (ref 5.7–8.2)
PROTEIN (URINE DIPSTICK): >=300 MG/DL
RBC # BLD AUTO: 4.87 X10(6)UL
SODIUM SERPL-SCNC: 138 MMOL/L (ref 136–145)
SPECIFIC GRAVITY: 1.03 (ref 1–1.03)
URINE-COLOR: YELLOW
UROBILINOGEN,SEMI-QN: 0.2 MG/DL (ref 0–1.9)
WBC # BLD AUTO: 10.1 X10(3) UL (ref 4–11)

## 2024-07-31 PROCEDURE — 80053 COMPREHEN METABOLIC PANEL: CPT

## 2024-07-31 PROCEDURE — 36415 COLL VENOUS BLD VENIPUNCTURE: CPT

## 2024-07-31 PROCEDURE — 81002 URINALYSIS NONAUTO W/O SCOPE: CPT | Performed by: ADVANCED PRACTICE MIDWIFE

## 2024-07-31 PROCEDURE — 85025 COMPLETE CBC W/AUTO DIFF WBC: CPT

## 2024-07-31 PROCEDURE — 99213 OFFICE O/P EST LOW 20 MIN: CPT | Performed by: ADVANCED PRACTICE MIDWIFE

## 2024-08-01 ENCOUNTER — APPOINTMENT (OUTPATIENT)
Dept: ULTRASOUND IMAGING | Facility: HOSPITAL | Age: 34
End: 2024-08-01
Attending: OBSTETRICS & GYNECOLOGY

## 2024-08-01 ENCOUNTER — HOSPITAL ENCOUNTER (INPATIENT)
Facility: HOSPITAL | Age: 34
LOS: 6 days | Discharge: HOME | End: 2024-08-07
Attending: ADVANCED PRACTICE MIDWIFE | Admitting: OBSTETRICS & GYNECOLOGY

## 2024-08-01 ENCOUNTER — TELEPHONE (OUTPATIENT)
Dept: OBGYN CLINIC | Facility: CLINIC | Age: 34
End: 2024-08-01

## 2024-08-01 PROBLEM — O47.00 PRETERM CONTRACTIONS (HCC): Status: RESOLVED | Noted: 2024-07-19 | Resolved: 2024-08-01

## 2024-08-01 PROBLEM — O14.03 MILD PRE-ECLAMPSIA IN THIRD TRIMESTER (HCC): Status: ACTIVE | Noted: 2024-08-01

## 2024-08-01 PROBLEM — O14.93 PRE-ECLAMPSIA IN THIRD TRIMESTER (HCC): Status: ACTIVE | Noted: 2024-08-01

## 2024-08-01 PROBLEM — Z34.90 PREGNANCY (HCC): Status: ACTIVE | Noted: 2024-08-01

## 2024-08-01 LAB
ALBUMIN SERPL-MCNC: 3.5 G/DL (ref 3.2–4.8)
ALBUMIN/GLOB SERPL: 1.2 {RATIO} (ref 1–2)
ALP LIVER SERPL-CCNC: 144 U/L
ALT SERPL-CCNC: <7 U/L
ANION GAP SERPL CALC-SCNC: 7 MMOL/L (ref 0–18)
ANTIBODY SCREEN: NEGATIVE
AST SERPL-CCNC: 11 U/L (ref ?–34)
BASOPHILS # BLD AUTO: 0.03 X10(3) UL (ref 0–0.2)
BASOPHILS NFR BLD AUTO: 0.3 %
BILIRUB SERPL-MCNC: 0.3 MG/DL (ref 0.3–1.2)
BUN BLD-MCNC: 6 MG/DL (ref 9–23)
BUN/CREAT SERPL: 10.9 (ref 10–20)
CALCIUM BLD-MCNC: 8.8 MG/DL (ref 8.7–10.4)
CHLORIDE SERPL-SCNC: 113 MMOL/L (ref 98–112)
CO2 SERPL-SCNC: 19 MMOL/L (ref 21–32)
CREAT BLD-MCNC: 0.55 MG/DL
CREAT UR-SCNC: 85.2 MG/DL
DEPRECATED RDW RBC AUTO: 42.5 FL (ref 35.1–46.3)
EGFRCR SERPLBLD CKD-EPI 2021: 123 ML/MIN/1.73M2 (ref 60–?)
EOSINOPHIL # BLD AUTO: 0.1 X10(3) UL (ref 0–0.7)
EOSINOPHIL NFR BLD AUTO: 1.1 %
ERYTHROCYTE [DISTWIDTH] IN BLOOD BY AUTOMATED COUNT: 15.4 % (ref 11–15)
FASTING STATUS PATIENT QL REPORTED: NO
GLOBULIN PLAS-MCNC: 2.9 G/DL (ref 2–3.5)
GLUCOSE BLD-MCNC: 78 MG/DL (ref 70–99)
HCT VFR BLD AUTO: 36.5 %
HGB BLD-MCNC: 11.3 G/DL
IMM GRANULOCYTES # BLD AUTO: 0.04 X10(3) UL (ref 0–1)
IMM GRANULOCYTES NFR BLD: 0.5 %
LYMPHOCYTES # BLD AUTO: 3.2 X10(3) UL (ref 1–4)
LYMPHOCYTES NFR BLD AUTO: 36.1 %
MCH RBC QN AUTO: 24 PG (ref 26–34)
MCHC RBC AUTO-ENTMCNC: 31 G/DL (ref 31–37)
MCV RBC AUTO: 77.7 FL
MONOCYTES # BLD AUTO: 0.58 X10(3) UL (ref 0.1–1)
MONOCYTES NFR BLD AUTO: 6.5 %
NEUTROPHILS # BLD AUTO: 4.91 X10 (3) UL (ref 1.5–7.7)
NEUTROPHILS # BLD AUTO: 4.91 X10(3) UL (ref 1.5–7.7)
NEUTROPHILS NFR BLD AUTO: 55.5 %
OSMOLALITY SERPL CALC.SUM OF ELEC: 284 MOSM/KG (ref 275–295)
PLATELET # BLD AUTO: 250 10(3)UL (ref 150–450)
POTASSIUM SERPL-SCNC: 4.5 MMOL/L (ref 3.5–5.1)
PROT SERPL-MCNC: 6.4 G/DL (ref 5.7–8.2)
PROT UR-MCNC: 117.4 MG/DL (ref ?–14)
PROT/CREAT UR-RTO: 1.38
RBC # BLD AUTO: 4.7 X10(6)UL
RH BLOOD TYPE: POSITIVE
SODIUM SERPL-SCNC: 139 MMOL/L (ref 136–145)
T PALLIDUM AB SER QL IA: NONREACTIVE
WBC # BLD AUTO: 8.9 X10(3) UL (ref 4–11)

## 2024-08-01 PROCEDURE — 99223 1ST HOSP IP/OBS HIGH 75: CPT | Performed by: OBSTETRICS & GYNECOLOGY

## 2024-08-01 PROCEDURE — 76819 FETAL BIOPHYS PROFIL W/O NST: CPT | Performed by: OBSTETRICS & GYNECOLOGY

## 2024-08-01 RX ORDER — ACETAMINOPHEN 500 MG
1000 TABLET ORAL EVERY 6 HOURS PRN
Status: DISCONTINUED | OUTPATIENT
Start: 2024-08-01 | End: 2024-08-02

## 2024-08-01 RX ORDER — CHOLECALCIFEROL (VITAMIN D3) 25 MCG
1 TABLET,CHEWABLE ORAL DAILY
Status: DISCONTINUED | OUTPATIENT
Start: 2024-08-01 | End: 2024-08-02

## 2024-08-01 RX ORDER — DOCUSATE SODIUM 100 MG/1
100 CAPSULE, LIQUID FILLED ORAL 2 TIMES DAILY
Status: DISCONTINUED | OUTPATIENT
Start: 2024-08-01 | End: 2024-08-02

## 2024-08-01 RX ORDER — CALCIUM GLUCONATE 94 MG/ML
1 INJECTION, SOLUTION INTRAVENOUS ONCE AS NEEDED
Status: DISCONTINUED | OUTPATIENT
Start: 2024-08-01 | End: 2024-08-02

## 2024-08-01 RX ORDER — ACETAMINOPHEN 500 MG
500 TABLET ORAL EVERY 6 HOURS PRN
Status: DISCONTINUED | OUTPATIENT
Start: 2024-08-01 | End: 2024-08-02

## 2024-08-01 RX ORDER — CALCIUM CARBONATE 500 MG/1
1000 TABLET, CHEWABLE ORAL
Status: DISCONTINUED | OUTPATIENT
Start: 2024-08-01 | End: 2024-08-02

## 2024-08-01 RX ORDER — ZOLPIDEM TARTRATE 5 MG/1
5 TABLET ORAL NIGHTLY PRN
Status: DISCONTINUED | OUTPATIENT
Start: 2024-08-01 | End: 2024-08-02

## 2024-08-01 RX ORDER — SODIUM CHLORIDE, SODIUM LACTATE, POTASSIUM CHLORIDE, CALCIUM CHLORIDE 600; 310; 30; 20 MG/100ML; MG/100ML; MG/100ML; MG/100ML
INJECTION, SOLUTION INTRAVENOUS CONTINUOUS
Status: DISCONTINUED | OUTPATIENT
Start: 2024-08-01 | End: 2024-08-02

## 2024-08-01 RX ORDER — TERBUTALINE SULFATE 1 MG/ML
0.25 INJECTION, SOLUTION SUBCUTANEOUS ONCE
Status: COMPLETED | OUTPATIENT
Start: 2024-08-01 | End: 2024-08-01

## 2024-08-01 RX ORDER — BETAMETHASONE SODIUM PHOSPHATE AND BETAMETHASONE ACETATE 3; 3 MG/ML; MG/ML
12 INJECTION, SUSPENSION INTRA-ARTICULAR; INTRALESIONAL; INTRAMUSCULAR; SOFT TISSUE EVERY 24 HOURS
Status: DISCONTINUED | OUTPATIENT
Start: 2024-08-01 | End: 2024-08-02

## 2024-08-01 NOTE — TELEPHONE ENCOUNTER
Patient verified name and     Patient states she is on her way to [facility], was recommended by CNM to go to the hospital due to elevated blood pressure. Patient did not go in, she is now on her way due to BP reading of 150/105. RN at [facility] notified and CNM on call notified.

## 2024-08-01 NOTE — H&P
[facility]  Obstetrics and Gynecology  History & Physical    Xu Martinez Patient Status:  Inpatient    1990 MRN [id number]   Location [facility] Attending Carlos Renae MD   Hospital Day 0 PCP No primary care provider on file.     CC: Patient is here for headache and elevated blood pressure.     SUBJECTIVE:    Xu Martinez is a 34 year old  female at 34w3d Estimated Date of Delivery: 24 who is being admitted for observation. Her current obstetrical history is significant for AMA, history of preeclampsia, history of  demise at 15 days of life. Patient reports no complaints. Denies headache at this time. Noted it resolved upon arrival to OB triage. Noted lower abdominal pain that is also better at this time. No vaginal bleeding, and no leakage of fluid. Denies chest pain and shortness of breath.     ZACH Confirmation  LMP: Patient's last menstrual period was 2023 (exact date).  ZACH: 2024, by Last Menstrual Period     OB Ultrasounds:   1) OB US 24  \"Ultrasound findings:      Single IUP in cephalic presentation.    Placenta is posterior, left.   A 3 vessel cord is noted.  Cardiac activity is present at 154 bpm  EFW 1943 g ( 4 lb 5 oz); 27%.  AC 13%.  MVP is 4.4 cm . KARUNA 9.7 cm  BPP is 8/8.      The extremities/RVOT, 4 chamber view and profile are suboptimal secondary to fetal position/late gestational age. \"     Obstetric History:   OB History    Para Term  AB Living   3 2 2     1   SAB IAB Ectopic Multiple Live Births           2      # Outcome Date GA Lbr Gideon/2nd Weight Sex Type Anes PTL Lv   3 Current            2 Term 23    M NORMAL SPONT  N ND      Birth Comments: [place]   1 Term 07/10/16   6 lb (2.722 kg) M NORMAL SPONT  N MARCUS     Past Medical History: History reviewed. No pertinent past medical history.  Past Social History: History reviewed. No pertinent surgical history.  Family History: History  reviewed. No pertinent family history.  Social History:   Social History     Tobacco Use    Smoking status: Never    Smokeless tobacco: Never   Substance Use Topics    Alcohol use: Not on file       Home Meds:   Medications Prior to Admission   Medication Sig Dispense Refill Last Dose    Ferrous Sulfate 325 (65 Fe) MG Oral Tab Take 1 tablet (325 mg total) by mouth every other day. 30 tablet 1 Past Week    prenatal vitamin with DHA 27-0.8-228 MG Oral Cap Take 1 capsule by mouth daily.   2024    [] clotrimazole 2 % Vaginal Cream Place 1 Applicatorful vaginally nightly for 3 days. 21 g 0     [] cephalexin (KEFLEX) 500 MG Oral Cap Take 1 capsule (500 mg total) by mouth 4 (four) times daily for 10 days. 40 capsule 0     [] famotidine (PEPCID) 20 MG Oral Tab Take 1 tablet (20 mg total) by mouth 2 (two) times daily as needed for Heartburn. (Patient not taking: Reported on 2024) 60 tablet 0     [] docusate sodium 100 MG Oral Cap Take 1 capsule (100 mg total) by mouth 2 (two) times daily. (Patient not taking: Reported on 2024) 60 capsule 0      Allergies: No Known Allergies    OBJECTIVE:    Pulse:  [59-65] 61  BP: (132-157)/() 157/96  There is no height or weight on file to calculate BMI.    General: AAO. NAD.   Lungs: respirations non labored.   CV: Peripherial perfusion normal bilaterally   Abdomen: soft, nontender, nondistended, no abnormal masses, no epigastric pain and FHT present, gravid   Extremities: negative edema bilaterally, negative calf tenderness bilaterally    FHT: moderate variability/140 BPM / Positive accelerations/Negative decelerations   TOCO: q 10 minutes    SVE: per CNM closed / 50 / -/ soft/ anterior/ cephalic.   Prenatal Labs Brief Review   Blood Type:   Lab Results   Component Value Date    ABO O 2024    RH Positive 2024     GBS:  Ordered to be collected.       Inpatient labs:  Lab Results   Component Value Date    WBC 8.9 2024    HGB  11.3 2024    HCT 36.5 2024    .0 2024    CREATSERUM 0.55 2024    BUN 6 2024     2024    K 4.5 2024     2024    CO2 19.0 2024    GLU 78 2024    CA 8.8 2024    ALB 3.5 2024    ALKPHO 144 2024    BILT 0.3 2024    TP 6.4 2024    AST 11 2024    ALT <7 2024       ASSESSMENT/ PLAN:    Xu Martinez is a 34 year old  female at 34w3d Estimated Date of Delivery: 24 who is being admitted for observation for mild preeclampsia.    Patient Active Problem List   Diagnosis    History of  death    Heartburn during pregnancy in second trimester ([facility])    Slow transit constipation    Prediabetes    Anemia in pregnancy, third trimester ([facility])    AMA (advanced maternal age) multigravida 35+ ([facility])    Pre-eclampsia in third trimester ([facility])    Pregnancy ([facility])       1. Mild preeclampsia:   - Diagnosed with elevated blood presure and proteinuria.   - Headache present on admission resolved with tylenol.   - Reviewed labs and noted for proteinuria.   - MFM on consult who recommends observation and delivery if severe features.   - General diet.   - BMTZ course.   - Continue to monitor for sign and symptoms of severe features.   2. Fetal monitoring: CEFM  3. GBS: To be collected  4. Pain: Epidural.     Risks, benefits, alternatives and possible complications have been discussed in detail with the patient.  Pre-admission, admission, and post admission procedures and expectations were discussed in detail.  All questions answered, all appropriate consents signed at the Hospital. Admission is planned for today.     Dr. Carlos Renae MD    [facility]       Addendum at 1839:  Discussed case with maternal-fetal medicine on-call.  Agreed that without severe features not to start magnesium.  Will await for any severe features and start magnesium and proceed with induction of labor if having severe features.      Carlos Renae MD    [facility]     This note was created by [employer] voice recognition. Errors in content may be related to improper recognition by the system; efforts to review and correct have been done but errors may still exist. Please be advised the primary purpose of this note is for me to communicate medical care. Standard sentence structure is not always used. Medical terminology and medical abbreviations may be used. There may be grammatical, typographical, and automated fill ins that may have errors missed in proofreading.

## 2024-08-01 NOTE — CONSULTS
[facility]  Maternal-Fetal Medicine Inpatient Consultation    Date of Admission:  2024  Date of Consult:  2024    Reason for Consult:   Preeclampsia    History of Present Illness:  Xu Martinez is a a(n) 34 year old female.  with an IUP at Gestational Age: 34w3d    Who  presents with elevated blood pressure readings at home and mild headache.  She was seen a 24 ago for  contractions.  She was treated for urinary tract infection and discharged after she was making cervical change.  Incidentally her fetal fibronectin resulted as positive.  She was noted to have some high blood pressures at that time which resolved.  She was advised to monitor her blood pressure at home and preeclampsia labs were ordered.    This morning she had a mild headache and had elevated blood pressure readings.  She called her provider office and appropriately came into triage.  Here her blood pressure readings have mostly been in the 150s over 90s she would have an occasional 160 but not a sustained elevated blood systolic blood pressure reading.  Protein creatinine ratio is elevated at 1.38 her serum creatinine is normal at 0.55, no elevation in liver function tests and her CBC was unremarkable.    Review of History:      OB History:    OB History    Para Term  AB Living   3 2 2 0 0 1   SAB IAB Ectopic Multiple Live Births   0 0 0 0 2      # Outcome Date GA Lbr Gideon/2nd Weight Sex Type Anes PTL Lv   3 Current            2 Term 23    M NORMAL SPONT  N ND      Birth Comments: [place]   1 Term 07/10/16   6 lb (2.722 kg) M NORMAL SPONT  N MARCUS       Other Relevant History:  History reviewed. No pertinent past medical history.  History reviewed. No pertinent surgical history.  History reviewed. No pertinent family history.   reports that she has never smoked. She has never used smokeless tobacco.    Allergies:  No Known Allergies    Medications:    Current Facility-Administered  Medications:     acetaminophen (Tylenol Extra Strength) tab 1,000 mg, 1,000 mg, Oral, Q6H PRN    lactated ringers infusion, , Intravenous, Continuous    acetaminophen (Tylenol Extra Strength) tab 500 mg, 500 mg, Oral, Q6H PRN **OR** acetaminophen (Tylenol Extra Strength) tab 1,000 mg, 1,000 mg, Oral, Q6H PRN    zolpidem (Ambien) tab 5 mg, 5 mg, Oral, Nightly PRN    docusate sodium (Colace) cap 100 mg, 100 mg, Oral, BID    calcium carbonate (Tums) chewable tab 1,000 mg, 1,000 mg, Oral, Daily PRN    prenatal vitamin with DHA (PNV with DHA) cap 1 capsule, 1 capsule, Oral, Daily    betamethasone sodium phosphate & acetate (Celestone) 6 (3-3) MG/ML injection 12 mg, 12 mg, Intramuscular, Q24H    Physical examination:  Physical Exam:   General: Alert, orientated x3.  Cooperative.  No apparent distress.  Vital Signs:  Pulse:  [59-90] 68  BP: (132-172)/() 135/91  HEENT: Exam is unremarkable.  Abdomen:  Gravid uterus appropriate for GA Nontender.  Extremities:  No lower extremity edema noted.  Skin: Normal texture and turgor.  SVE -deferred to her delivery care provider    She reports while in triage she is starting to have lower abdominal cramping.  While I was in the patient bedside I adjusted the toco monitor but did not palpate any contractions.    Fetal heart rate tracing was reviewed and category 1  Fetal heart rate baseline is 145 bpm.  Moderate variability.  Accelerations noted.  No decelerations  Iuka -contractions noted every 5 to 15 minutes    Laboratory Data:  Lab Results   Component Value Date    WBC 8.9 08/01/2024    HGB 11.3 08/01/2024    HCT 36.5 08/01/2024    .0 08/01/2024    CREATSERUM 0.55 08/01/2024    BUN 6 08/01/2024     08/01/2024    K 4.5 08/01/2024     08/01/2024    CO2 19.0 08/01/2024    GLU 78 08/01/2024    CA 8.8 08/01/2024    ALB 3.5 08/01/2024    ALKPHO 144 08/01/2024    BILT 0.3 08/01/2024    TP 6.4 08/01/2024    AST 11 08/01/2024    ALT <7 08/01/2024         NARRATIVE:         Preeclampsia refers to the new onset of hypertension and proteinuria after 20 weeks of gestation in a previously normotensive woman.  Preeclampsia occurs in approximately 3 to 14 percent of all pregnancies worldwide, and about 5 to 8 percent in the [place].  The pathogenesis of preeclampsia is incompletely understood, but the disorder is clearly initiated by the presence of the trophoblast, and impaired placental angiogenesis plays an important role.   The clinical manifestations of preeclampsia can appear anytime from the second trimester to the first few weeks postpartum. The majority of cases of preeclampsia arise in low-risk nulliparous women without medical complications or identifiable risk factors.   Women with early, severe preeclampsia are at greatest risk of recurrence (25 to 65 percent), the incidence is much lower (5 to 7 percent) in women who had mild preeclampsia during the first pregnancy.             The ability to predict preeclampsia is currently of limited benefit because neither the development of the disorder nor its progression from mild to severe disease can be prevented in most patients, and there is no cure except delivery. Nevertheless, the accurate identification of women at risk, early diagnosis, and prompt and appropriate management will lead to an improvement in maternal, and possibly , outcome.               Risk factors for preeclampsia include: Nulliparity,  Preeclampsia in a previous pregnancy,  Age >40 years or <18 years,  Family history of pregnancy-induced hypertension, Chronic hypertension,  Chronic renal disease,  Antiphospholipid antibody syndrome or inherited thrombophilia, Vascular or connective tissue disease, Diabetes mellitus (pregestational and gestational),  Multifetal gestation, High body mass index,  Male partner whose previous partner had preeclampsia, Hydrops fetalis, and Unexplained fetal growth restriction.               The weight of  evidence indicates that inherited thrombophilias (such as Factor V Leiden mutation, prothrombin gene mutation, protein C or S deficiency, antithrombin III deficiency) are not associated with preeclampsia. Therefore, screening pregnant women for inherited thrombophilias is not useful for predicting those at high risk of developing preeclampsia.  Antiphospholipid antibody syndrome is associated with development of severe early preeclampsia and screening is recommended.  Measurement of angiogenic factors (VEGF, PlGF, sFlt-1, Lanette) in blood or urine is the most promising approach for predicting preeclampsia; however, these tests are investigational and are not available for clinical use at present.             Data from multiple observational studies suggest that preeclampsia predicts remote cardiovascular events (eg, hypertension, ischemic heart disease, stroke). Review of all of a woman's pregnancies is necessary to define her long-term risk accurately. Those with early onset severe preeclampsia, recurrent preeclampsia, gestational hypertension, or preeclampsia with onset as a multipara appear to be at highest risk of cardiovascular disease later in life, including during the premenopausal period. These women may have unrecognized latent hypertension, an inherited thrombophilia, or other genetic or environmental factors predisposing them to hypertension during and subsequent to pregnancy. In contrast, preeclampsia/eclampsia occurring late in gestation in primigravid women and followed by a normotensive pregnancy does not appear to be associated with increased remote cardiovascular risk.  For this reason screening for the inherited thrombophilia may be worth while.                 Many studies have been performed to try to find a way to prevent preeclampsia.  These include the use of fish oil, vitamin C, Vitamin E, calcium and aspirin.  A few studies have shown benefit with aspirin but others have not.  Aspirin therapy  is not recommended for women at low-risk for development of preeclampsia. We suggest use of low dose aspirin in women at moderate to high risk of developing preeclampsia, but no therapy is a reasonable alternative.   No drug prevents progression to more severe disease; use of any drug in this setting is not recommended.        We discussed the morbidity and mortality associated with prematurity at various gestational ages.  The signs and symptoms of  labor were discussed.  We talked about potential risks and benefits associated with   steroid.     Patient and her  had their questions answered to their satisfaction  IMPRESSION:    IUP at 34w3d  Preeclampsia without severe features  Advanced maternal age    RECOMMENDATIONS:   Complete betamethasone course  Preeclampsia labs tonight and tomorrow morning.  Magnesium x 24 hours total.  It can be discontinued at that time if she has controlled BPs and is asymptomatic.  If severe preeclampsia by symptoms or lab changes, then delivery is indicated at that time.  If she only has severe range BPs with or without proteinuria (preeclampsia with severe features), we will closely monitor the patient and manage her with antihypertensive therapy with a goal of delivery at 34 weeks or until she develops lab changes or symptoms.  If she does not develop severe features of preeclampsia, outpatient management may become an option if twice weekly  surveillance and plan delivery at 37 weeks    Total time spent 45 minutes this calendar day which includes preparing to see the patient including chart review, obtaining and/or reviewing additional medical history, performing a physical exam and evaluation, documenting clinical information in the electronic medical record, independently interpreting results, counseling the patient, communicating results to the patient/family/caregiver and coordinating care.     Case discussed with patient who demonstrated  understanding and agreement with plan.  Ms. Berg and I discussed the patient.  She will be transferring care to her collaborating OB physicians.     Thank you for allowing me to participate in the care of this patient.  Please feel free to contact me with any questions.    Gail Elam MD  Maternal-Fetal Medicine      Gail Elam MD  8/1/2024  4:18 PM (late entry)

## 2024-08-01 NOTE — H&P
Patient: Josy Matthews Date: 2018   : 1935 Attending: Syed Courtney MD   83 year old female      Chief Complaint: Nausea, diarrhea and dehydration    Subjective: Feels much better and close to baseline state of health    Problem List:   Patient Active Problem List   Diagnosis   • Hip joint replacement by other means   • Failed back surgical syndrome   • Chest pain   • Left upper extremity numbness   • Hypertensive urgency   • Chronic kidney disease, stage III (moderate)   • SHARMILA (acute kidney injury) (CMS/HCC)   • Stage 3 chronic kidney disease   • Chronic diastolic heart failure (CMS/HCC)   • Hyperkalemia   • Bradycardia   • Chronic hip pain, right   • Coronary artery disease involving native coronary artery of native heart without angina pectoris   • Type 2 diabetes mellitus without complication (CMS/HCC)   • History of breast cancer   • Debility   • Elevated LFTs   • Altered mental state   • Essential hypertension, benign   • Anxiety and depression   • Anemia of chronic disease   • CAST (dyspnea on exertion)   • Essential hypertension, benign   • Stage 3 chronic kidney disease   • Anemia of chronic disease   • Chronic diastolic HF (heart failure) (CMS/HCC)   • Right sided weakness   • Status post CVA   • Generalized weakness   • Impaired mobility   • Pulmonary hypertension       Allergies:   ALLERGIES:   Allergen Reactions   • Amlodipine SWELLING   • Cymbalta [Duloxetine Hcl] Other (See Comments)   • Glipizide Other (See Comments)   • Penicillins    • Talwin Other (See Comments)   • Tegretol    • Zithromax [Azithromycin] Other (See Comments)       Medications/Infusions: Reviewed    Review of Systems: All systems reviewed and negative except for those mentioned in the history of present illness                Vital Last Value 24 Hour Range   Temperature 98.4 °F (36.9 °C) (18 1240) Temp  Min: 98.1 °F (36.7 °C)  Max: 98.9 °F (37.2 °C)   Pulse 77 (18 1240) Pulse  Min: 73  Max: 94  [facility]  [facility] of [facility]    History & Physical    Xu Martinez Patient Status:  Outpatient    1990 MRN [id number]   Location [facility] Attending Lindy Berg CNM   Hosp Day # 0 Admitting Carlos Renae MD     Date of Admission:  2024      HPI:   Xu Martinez is 34 year old and  with current gestational age of 34w3d and estimated due date of 2024, by Last Menstrual Period consistent with a 23wga ultrasound.    Xu is being admitted for observation.    Her current obstetrical history is significant for  advanced maternal age (AMA), prediabetes, prior  death, anemia, and now pre-eclampsia at 34wga .    Patient reports headache .   No contractions earlier today but now is feeling lower abdominal pain since arrival, leaking, or vaginal bleeding.  Fetal Movement: normal.     History   Obstetric History:   OB History    Para Term  AB Living   3 2 2     1   SAB IAB Ectopic Multiple Live Births           2      # Outcome Date GA Lbr Gideon/2nd Weight Sex Type Anes PTL Lv   3 Current            2 Term 23    M NORMAL SPONT  N ND      Birth Comments: [place]   1 Term 07/10/16   6 lb (2.722 kg) M NORMAL SPONT  N MARCUS     Past Medical History: History reviewed. No pertinent past medical history.  Past Social History: History reviewed. No pertinent surgical history.  Family History: History reviewed. No pertinent family history.  Social History:   Social History     Tobacco Use    Smoking status: Never    Smokeless tobacco: Never   Substance Use Topics    Alcohol use: Not on file        Allergies/Medications:   Allergies:   No Known Allergies  Medications:  Medications Prior to Admission   Medication Sig Dispense Refill Last Dose    Ferrous Sulfate 325 (65 Fe) MG Oral Tab Take 1 tablet (325 mg total) by mouth every other day. 30 tablet 1 Past Week    prenatal vitamin with DHA 27-0.8-228 MG Oral Cap Take 1 capsule by mouth    Respiratory 18 (01/27/18 1240) Resp  Min: 16  Max: 20   Non-Invasive  Blood Pressure 167/75 (01/27/18 1240) BP  Min: 141/69  Max: 181/74   Pulse Oximetry 98 % (01/27/18 1240) SpO2  Min: 96 %  Max: 100 %     Vital Today Admitted   Weight 74.8 kg (01/27/18 0548) Weight: 79.4 kg (01/23/18 1610)   Height N/A Height: 5' 4\" (162.6 cm) (01/23/18 1610)   BMI N/A BMI (Calculated): 30.12 (01/23/18 1610)       Intake/Output:      Intake/Output Summary (Last 24 hours) at 01/27/18 1336  Last data filed at 01/27/18 1243   Gross per 24 hour   Intake              720 ml   Output             1775 ml   Net            -1055 ml       Physical Exam:   GEN: no acute distress  HS: regular , no gallop or murmur   LINGS: clear to auscultation   ABD: soft , non tender, BS present   EXT: no edema or calf tenderness   NS: alert , well oriented, no motor or sensory deficits       Laboratory Results:     Recent Labs  Lab 01/27/18  0550 01/26/18  0537 01/25/18  1045  01/24/18  0400 01/23/18  1625   WBC  --  10.7 11.0  --  12.8* 12.4*   HCT  --  22.8* 24.8*  --  24.8* 24.8*   HGB  --  7.7* 8.2*  --  8.4* 8.3*   PLT  --  409 417  --  442 477*   INR  --   --   --   --   --  1.3   SODIUM 138 138 138  --  139 136   POTASSIUM 4.0 4.3 3.9  < > 3.6 4.2   CHLORIDE 103 104 105  --  106 103   CO2 25 26 24  --  23 22   CALCIUM 9.1 9.4 9.0  --  8.9 9.0   GLUCOSE 104* 98 108*  --  117* 134*   BUN 23* 22* 25*  --  34* 36*   CREATININE 1.97* 1.83* 1.85*  --  2.49* 2.99*   AST  --   --   --   --   --  21   GPT  --   --   --   --   --  25   ALKPT  --   --   --   --   --  74   BILIRUBIN  --   --   --   --   --  0.3   ALBUMIN  --   --   --   --   --  3.2*   GFRNA 23 25 25  --  17 14   PHOS 3.3  --   --   --   --   --    < > = values in this interval not displayed.    Imaging: No results found.    Assessment:   1.dehydration, acute kidney injury improved  2.CK D3  3.bradycardia, improved. Taken off clonidine, beta blocker adjusted  4.suspected  daily.   2024    [] clotrimazole 2 % Vaginal Cream Place 1 Applicatorful vaginally nightly for 3 days. 21 g 0     [] cephalexin (KEFLEX) 500 MG Oral Cap Take 1 capsule (500 mg total) by mouth 4 (four) times daily for 10 days. 40 capsule 0     [] famotidine (PEPCID) 20 MG Oral Tab Take 1 tablet (20 mg total) by mouth 2 (two) times daily as needed for Heartburn. (Patient not taking: Reported on 2024) 60 tablet 0     [] docusate sodium 100 MG Oral Cap Take 1 capsule (100 mg total) by mouth 2 (two) times daily. (Patient not taking: Reported on 2024) 60 capsule 0        Review of Systems:   Constitutional: denies fever, aches, chills  HEENT: denies visual changes  Skin: denies any unusual skin lesions or itching  Lungs: denies shortness of breath  Cardiovascular: denies chest pain  GI: denies abdominal pain,denies heartburn, denies constipation or diarrhea  : denies dysuria, pelvic pain, vaginal discharge or bleeding  Musculoskeletal: denies back or joint pain  Neuro denies headaches or dizziness  Psych: denies depression or anxiety    Physical Exam:   Pulse:  [59-65] 62  BP: (132-157)/() 142/93    Constitutional: alert, appears stated age, and cooperative  Skin: no lesions or erythema  Respiratory: unlabored  Cardiac: regular rate  Abdomen: soft, non-tender, EFW 1943g by ultrasound on 24, presentation cephalic on that scan and cephalic by Leopold's and SVE today,   Pelvis: Gynecoid, proven to 2722g  External Genitalia:  No lesions  Sterile vaginal exam: 0/50/-5, soft, anterior, cephalic  Fetal Surveillance:  145 BPM; Fetal heart variability: moderate  Fetal Heart Rate decelerations: none  Fetal Heart Rate accelerations: yes  Pelvis: Pelvic exam deferred by this provider on this admission  Extremities: extremities normal, atraumatic, no cyanosis or edema  Musculoskeletal: steady gait  Psych:  Appropriate affect and speech    Results:     Prenatal Results       Initial        Test Value Reference Range Date Time    Blood Type (ABO Group)  O   24 1222    Rh Factor  Positive   24 1222    Antibody Screen (Required questions in OE to answer)        HCT        HGB        MCV        Platelets        Rubella  Positive  Positive 24 1222    TREP        RPR ([facility])        Urine Culture        Hepatitis B  Nonreactive  Nonreactive  24 1222      ^ neg   24     HIV Combo ^ negative   24     HCV ^ non-reactive   24               Optional Initial Labs       Test Value Reference Range Date Time    TSH ^ 1.230 mIU/mL  24     Pap Smear        HPV        GC DNA        Chlamydia DNA        GTT 1 Hr        Glucose Fasting        Glucose 1 Hr        Glucose 2 Hr        Glucose 3 Hr        HgB A1c        Vitamin D                  8-20 Weeks       Test Value Reference Range Date Time    1st Trimester Aneuploidy Risk Assessment        Quad - Down Screen Risk Estimate - prior to 18        Quad - Down Maternal Age Risk - prior to 18        Quad - Trisomy 18 screen Risk Estimate - prior to 18        AFP Spina Bifida (Required questions in OE to answer )        QUAD/AFP - Interpretation        Free Fetal DNA         Genetic testing        Genetic testing        Genetic testing        GC DNA        Chlamydia DNA                  24-28 Weeks       Test Value Reference Range Date Time    HCT  34.2 % 35.0 - 48.0 24 1222    HGB  10.7 g/dL 12.0 - 16.0 24 1222    Platelets  332.0 10(3)uL 150.0 - 450.0 24 1222    GTT 1 Hr        Glucose Fasting  80 mg/dL See comment 24 1015    Glucose 1 Hr  166 mg/dL See comment 24 1120    Glucose 2 Hr  140 mg/dL See comment 24 1220    Glucose 3 Hr  117 mg/dL 70 - <140 24 1317    TSH  1.272 mIU/mL 0.550 - 4.780 24 1222     Profile  Negative   24 1222    Urine Culture        GC DNA  Negative  Negative 24 1347    Chlamydia DNA  Negative  Negative  pneumonia    Plans/Recommendations:   Continue current medications and supportive care, unable to return to assisted living over the weekend       24 1347              35-37 Weeks       Test Value Reference Range Date Time    HCT  36.5 % 35.0 - 48.0 24 1129       37.7 % 35.0 - 48.0 24 1118       35.5 % 35.0 - 48.0 24 1125    HGB  11.3 g/dL 12.0 - 16.0 24 1129       11.6 g/dL 12.0 - 16.0 24 1118       11.4 g/dL 12.0 - 16.0 24 1125    Platelets  250.0 10(3)uL 150.0 - 450.0 24 1129       288.0 10(3)uL 150.0 - 450.0 24 1118       296.0 10(3)uL 150.0 - 450.0 24 1125    TREP  Nonreactive  Nonreactive  24 1222    RPR ([facility])        Genital Group B Culture        TSH        Urine Culture  No Growth at 18-24 hrs.   24 1120       <10,000 cfu/ml Multiple species present- probable contamination.   24 1315       10-50,000 cfu/ml Multiple species present-probable contamination.   24 1222    HIV Combo  Non-Reactive  Non-Reactive 24 1222    GC DNA        Chlamydia DNA                  Optional Labs       Test Value Reference Range Date Time    HgB A1c  5.7 % <5.7 24 1222    HGB Electrophoresis  (See Report)    24 1222    Varicella Zoster  249.60  >165.00 24 1222    Cystic Fibrosis-Old         Cystic Fibrosis[32] (Required questions in OE to answer)        Cystic Fibrosis[165] (Required questions in OE to answer)        Cystic Fibrosis[165] (Required questions in OE to answer)        Cystic Fibrosis[165] (Required questions in OE to answer)        Sickle Cell        24Hr Urine Protein        24Hr Urine Creatinine        Parvo B19 IgM        Parvo B19 IgG                  Legend    ^: Historical                            Reviewed all prenatal ultrasounds    Admit labs pending    Assessment/Plan:   Xu is 34 year old and  with current gestational age of 34w3d and estimated due date of 2024, by Last Menstrual Period consistent with a 23wga ultrasound.    Not in labor.  Category 1 FHR tracing  Obstetrical history significant for  AMA, prediabetes, prior   death, anemia, and now pre-eclampsia at 34wga .    Admission problem(s):    Pre-eclampsia in third trimester (HCC)  Symptomatic with a headache  Elevated blood pressures documented since 25: 149/100 then 123/90  Today BP's are all mildly elevated  ASL/ALT WNL  Creatinine WNL  Platelets WNL  P/C ratio 1.38  Consulted with Dr Elam who examined the patient in Triage and agrees with plan for admit/observe blood pressures, 24hr urine collection, betamethasone, and transfer of care to [facility] for further management this pregnancy      Risks, benefits, alternatives and possible complications have been discussed in detail with the patient.   Pre-admission, admission, and post admission procedures and expectations were discussed in detail.    All questions answered, all appropriate consents will be signed at the Hospital. Admission is planned for today.   Not in labor. Observation at this time .    Lindy Berg CNM  2024  12:45 PM

## 2024-08-01 NOTE — TELEPHONE ENCOUNTER
Patient is 34w3d and states she is heading to the hospital, states she's having lower abdominal pain and blood pressure is elevated. Please advise

## 2024-08-02 ENCOUNTER — ANESTHESIA EVENT (OUTPATIENT)
Dept: OBGYN UNIT | Facility: HOSPITAL | Age: 34
End: 2024-08-02

## 2024-08-02 ENCOUNTER — ANESTHESIA (OUTPATIENT)
Dept: OBGYN UNIT | Facility: HOSPITAL | Age: 34
End: 2024-08-02

## 2024-08-02 LAB
ALBUMIN SERPL-MCNC: 3.4 G/DL (ref 3.2–4.8)
ALBUMIN/GLOB SERPL: 1.3 {RATIO} (ref 1–2)
ALP LIVER SERPL-CCNC: 131 U/L
ALT SERPL-CCNC: <7 U/L
ANION GAP SERPL CALC-SCNC: 11 MMOL/L (ref 0–18)
AST SERPL-CCNC: 13 U/L (ref ?–34)
BASOPHILS # BLD: 0 X10(3) UL (ref 0–0.2)
BASOPHILS NFR BLD: 0 %
BILIRUB SERPL-MCNC: 0.3 MG/DL (ref 0.3–1.2)
BUN BLD-MCNC: 9 MG/DL (ref 9–23)
BUN/CREAT SERPL: 16.1 (ref 10–20)
CALCIUM BLD-MCNC: 9.1 MG/DL (ref 8.7–10.4)
CHLORIDE SERPL-SCNC: 112 MMOL/L (ref 98–112)
CO2 SERPL-SCNC: 17 MMOL/L (ref 21–32)
CREAT BLD-MCNC: 0.56 MG/DL
DEPRECATED RDW RBC AUTO: 40.1 FL (ref 35.1–46.3)
EGFRCR SERPLBLD CKD-EPI 2021: 122 ML/MIN/1.73M2 (ref 60–?)
EOSINOPHIL # BLD: 0 X10(3) UL (ref 0–0.7)
EOSINOPHIL NFR BLD: 0 %
ERYTHROCYTE [DISTWIDTH] IN BLOOD BY AUTOMATED COUNT: 15.4 % (ref 11–15)
GLOBULIN PLAS-MCNC: 2.7 G/DL (ref 2–3.5)
GLUCOSE BLD-MCNC: 92 MG/DL (ref 70–99)
HCT VFR BLD AUTO: 33.6 %
HGB BLD-MCNC: 11 G/DL
LYMPHOCYTES NFR BLD: 15 %
LYMPHOCYTES NFR BLD: 2.54 X10(3) UL (ref 1–4)
MCH RBC QN AUTO: 24.1 PG (ref 26–34)
MCHC RBC AUTO-ENTMCNC: 32.7 G/DL (ref 31–37)
MCV RBC AUTO: 73.5 FL
MONOCYTES # BLD: 0.85 X10(3) UL (ref 0.1–1)
MONOCYTES NFR BLD: 5 %
NEUTROPHILS # BLD AUTO: 12.59 X10 (3) UL (ref 1.5–7.7)
NEUTROPHILS NFR BLD: 78 %
NEUTS BAND NFR BLD: 2 %
NEUTS HYPERSEG # BLD: 13.52 X10(3) UL (ref 1.5–7.7)
OSMOLALITY SERPL CALC.SUM OF ELEC: 288 MOSM/KG (ref 275–295)
PLATELET # BLD AUTO: 235 10(3)UL (ref 150–450)
PLATELET MORPHOLOGY: NORMAL
POTASSIUM SERPL-SCNC: 4.3 MMOL/L (ref 3.5–5.1)
PROT SERPL-MCNC: 6.1 G/DL (ref 5.7–8.2)
RBC # BLD AUTO: 4.57 X10(6)UL
SODIUM SERPL-SCNC: 140 MMOL/L (ref 136–145)
TOTAL CELLS COUNTED BLD: 100
WBC # BLD AUTO: 16.9 X10(3) UL (ref 4–11)

## 2024-08-02 PROCEDURE — 04QY0ZZ REPAIR LOWER ARTERY, OPEN APPROACH: ICD-10-PCS | Performed by: OBSTETRICS & GYNECOLOGY

## 2024-08-02 PROCEDURE — 59514 CESAREAN DELIVERY ONLY: CPT | Performed by: OBSTETRICS & GYNECOLOGY

## 2024-08-02 PROCEDURE — 10H07YZ INSERTION OF OTHER DEVICE INTO PRODUCTS OF CONCEPTION, VIA NATURAL OR ARTIFICIAL OPENING: ICD-10-PCS | Performed by: OBSTETRICS & GYNECOLOGY

## 2024-08-02 RX ORDER — BUPIVACAINE HYDROCHLORIDE 2.5 MG/ML
20 INJECTION, SOLUTION EPIDURAL; INFILTRATION; INTRACAUDAL ONCE
Status: DISCONTINUED | OUTPATIENT
Start: 2024-08-02 | End: 2024-08-02 | Stop reason: HOSPADM

## 2024-08-02 RX ORDER — AMMONIA INHALANTS 0.04 G/.3ML
0.3 INHALANT RESPIRATORY (INHALATION) AS NEEDED
Status: DISCONTINUED | OUTPATIENT
Start: 2024-08-02 | End: 2024-08-07

## 2024-08-02 RX ORDER — ONDANSETRON 2 MG/ML
4 INJECTION INTRAMUSCULAR; INTRAVENOUS EVERY 6 HOURS PRN
Status: DISCONTINUED | OUTPATIENT
Start: 2024-08-02 | End: 2024-08-03

## 2024-08-02 RX ORDER — GABAPENTIN 300 MG/1
300 CAPSULE ORAL EVERY 8 HOURS PRN
Status: DISCONTINUED | OUTPATIENT
Start: 2024-08-02 | End: 2024-08-03

## 2024-08-02 RX ORDER — ONDANSETRON 2 MG/ML
4 INJECTION INTRAMUSCULAR; INTRAVENOUS ONCE AS NEEDED
Status: DISCONTINUED | OUTPATIENT
Start: 2024-08-02 | End: 2024-08-02

## 2024-08-02 RX ORDER — ONDANSETRON 2 MG/ML
INJECTION INTRAMUSCULAR; INTRAVENOUS AS NEEDED
Status: DISCONTINUED | OUTPATIENT
Start: 2024-08-02 | End: 2024-08-02 | Stop reason: SURG

## 2024-08-02 RX ORDER — SODIUM CHLORIDE, SODIUM LACTATE, POTASSIUM CHLORIDE, CALCIUM CHLORIDE 600; 310; 30; 20 MG/100ML; MG/100ML; MG/100ML; MG/100ML
INJECTION, SOLUTION INTRAVENOUS CONTINUOUS
Status: DISCONTINUED | OUTPATIENT
Start: 2024-08-02 | End: 2024-08-02

## 2024-08-02 RX ORDER — METOCLOPRAMIDE 10 MG/1
10 TABLET ORAL ONCE
Status: COMPLETED | OUTPATIENT
Start: 2024-08-02 | End: 2024-08-02

## 2024-08-02 RX ORDER — SODIUM CHLORIDE, SODIUM LACTATE, POTASSIUM CHLORIDE, CALCIUM CHLORIDE 600; 310; 30; 20 MG/100ML; MG/100ML; MG/100ML; MG/100ML
INJECTION, SOLUTION INTRAVENOUS CONTINUOUS PRN
Status: DISCONTINUED | OUTPATIENT
Start: 2024-08-02 | End: 2024-08-02 | Stop reason: SURG

## 2024-08-02 RX ORDER — FAMOTIDINE 10 MG/ML
INJECTION, SOLUTION INTRAVENOUS
Status: COMPLETED
Start: 2024-08-02 | End: 2024-08-02

## 2024-08-02 RX ORDER — MORPHINE SULFATE 4 MG/ML
4 INJECTION, SOLUTION INTRAMUSCULAR; INTRAVENOUS EVERY 5 MIN PRN
Status: DISCONTINUED | OUTPATIENT
Start: 2024-08-02 | End: 2024-08-02

## 2024-08-02 RX ORDER — SIMETHICONE 80 MG
80 TABLET,CHEWABLE ORAL 3 TIMES DAILY PRN
Status: DISCONTINUED | OUTPATIENT
Start: 2024-08-02 | End: 2024-08-07

## 2024-08-02 RX ORDER — SODIUM CHLORIDE 9 MG/ML
INJECTION, SOLUTION INTRAVENOUS CONTINUOUS
Status: DISCONTINUED | OUTPATIENT
Start: 2024-08-02 | End: 2024-08-03

## 2024-08-02 RX ORDER — CITRIC ACID/SODIUM CITRATE 334-500MG
SOLUTION, ORAL ORAL
Status: DISPENSED
Start: 2024-08-02 | End: 2024-08-02

## 2024-08-02 RX ORDER — FAMOTIDINE 20 MG/1
20 TABLET, FILM COATED ORAL ONCE
Status: COMPLETED | OUTPATIENT
Start: 2024-08-02 | End: 2024-08-02

## 2024-08-02 RX ORDER — ACETAMINOPHEN 500 MG
1000 TABLET ORAL EVERY 6 HOURS
Status: DISCONTINUED | OUTPATIENT
Start: 2024-08-02 | End: 2024-08-07

## 2024-08-02 RX ORDER — ONDANSETRON 2 MG/ML
4 INJECTION INTRAMUSCULAR; INTRAVENOUS EVERY 6 HOURS PRN
Status: DISCONTINUED | OUTPATIENT
Start: 2024-08-02 | End: 2024-08-07

## 2024-08-02 RX ORDER — METOCLOPRAMIDE HYDROCHLORIDE 5 MG/ML
INJECTION INTRAMUSCULAR; INTRAVENOUS
Status: DISPENSED
Start: 2024-08-02 | End: 2024-08-02

## 2024-08-02 RX ORDER — NALBUPHINE HYDROCHLORIDE 10 MG/ML
2.5 INJECTION, SOLUTION INTRAMUSCULAR; INTRAVENOUS; SUBCUTANEOUS
Status: DISCONTINUED | OUTPATIENT
Start: 2024-08-02 | End: 2024-08-02

## 2024-08-02 RX ORDER — ENOXAPARIN SODIUM 100 MG/ML
40 INJECTION SUBCUTANEOUS DAILY
Status: DISCONTINUED | OUTPATIENT
Start: 2024-08-02 | End: 2024-08-02 | Stop reason: HOSPADM

## 2024-08-02 RX ORDER — SODIUM CHLORIDE 9 MG/ML
INJECTION, SOLUTION INTRAVENOUS ONCE
Status: DISCONTINUED | OUTPATIENT
Start: 2024-08-02 | End: 2024-08-02

## 2024-08-02 RX ORDER — DIPHENHYDRAMINE HYDROCHLORIDE 50 MG/ML
12.5 INJECTION INTRAMUSCULAR; INTRAVENOUS EVERY 4 HOURS PRN
Status: DISCONTINUED | OUTPATIENT
Start: 2024-08-02 | End: 2024-08-03

## 2024-08-02 RX ORDER — BUPIVACAINE HCL/0.9 % NACL/PF 0.25 %
5 PLASTIC BAG, INJECTION (ML) EPIDURAL AS NEEDED
Status: DISCONTINUED | OUTPATIENT
Start: 2024-08-02 | End: 2024-08-02

## 2024-08-02 RX ORDER — DEXAMETHASONE SODIUM PHOSPHATE 4 MG/ML
VIAL (ML) INJECTION AS NEEDED
Status: DISCONTINUED | OUTPATIENT
Start: 2024-08-02 | End: 2024-08-02 | Stop reason: SURG

## 2024-08-02 RX ORDER — METOCLOPRAMIDE HYDROCHLORIDE 5 MG/ML
10 INJECTION INTRAMUSCULAR; INTRAVENOUS ONCE
Status: COMPLETED | OUTPATIENT
Start: 2024-08-02 | End: 2024-08-02

## 2024-08-02 RX ORDER — KETOROLAC TROMETHAMINE 30 MG/ML
30 INJECTION, SOLUTION INTRAMUSCULAR; INTRAVENOUS ONCE
Status: COMPLETED | OUTPATIENT
Start: 2024-08-02 | End: 2024-08-02

## 2024-08-02 RX ORDER — MEPERIDINE HYDROCHLORIDE 25 MG/ML
12.5 INJECTION INTRAMUSCULAR; INTRAVENOUS; SUBCUTANEOUS ONCE AS NEEDED
Status: DISCONTINUED | OUTPATIENT
Start: 2024-08-02 | End: 2024-08-02

## 2024-08-02 RX ORDER — IBUPROFEN 600 MG/1
600 TABLET ORAL EVERY 6 HOURS
Status: DISCONTINUED | OUTPATIENT
Start: 2024-08-03 | End: 2024-08-07

## 2024-08-02 RX ORDER — KETOROLAC TROMETHAMINE 30 MG/ML
30 INJECTION, SOLUTION INTRAMUSCULAR; INTRAVENOUS EVERY 6 HOURS
Status: DISPENSED | OUTPATIENT
Start: 2024-08-02 | End: 2024-08-03

## 2024-08-02 RX ORDER — ENOXAPARIN SODIUM 100 MG/ML
40 INJECTION SUBCUTANEOUS DAILY
Status: DISCONTINUED | OUTPATIENT
Start: 2024-08-02 | End: 2024-08-07

## 2024-08-02 RX ORDER — NALOXONE HYDROCHLORIDE 0.4 MG/ML
0.08 INJECTION, SOLUTION INTRAMUSCULAR; INTRAVENOUS; SUBCUTANEOUS
Status: DISCONTINUED | OUTPATIENT
Start: 2024-08-02 | End: 2024-08-03

## 2024-08-02 RX ORDER — DOCUSATE SODIUM 100 MG/1
100 CAPSULE, LIQUID FILLED ORAL
Status: DISCONTINUED | OUTPATIENT
Start: 2024-08-02 | End: 2024-08-07

## 2024-08-02 RX ORDER — LIDOCAINE HYDROCHLORIDE 10 MG/ML
INJECTION, SOLUTION EPIDURAL; INFILTRATION; INTRACAUDAL; PERINEURAL AS NEEDED
Status: DISCONTINUED | OUTPATIENT
Start: 2024-08-02 | End: 2024-08-02 | Stop reason: SURG

## 2024-08-02 RX ORDER — FAMOTIDINE 10 MG/ML
20 INJECTION, SOLUTION INTRAVENOUS ONCE
Status: COMPLETED | OUTPATIENT
Start: 2024-08-02 | End: 2024-08-02

## 2024-08-02 RX ORDER — MORPHINE SULFATE 2 MG/ML
2 INJECTION, SOLUTION INTRAMUSCULAR; INTRAVENOUS EVERY 5 MIN PRN
Status: DISCONTINUED | OUTPATIENT
Start: 2024-08-02 | End: 2024-08-02

## 2024-08-02 RX ORDER — BISACODYL 10 MG
10 SUPPOSITORY, RECTAL RECTAL ONCE AS NEEDED
Status: DISCONTINUED | OUTPATIENT
Start: 2024-08-02 | End: 2024-08-07

## 2024-08-02 RX ORDER — DEXTROSE, SODIUM CHLORIDE, SODIUM LACTATE, POTASSIUM CHLORIDE, AND CALCIUM CHLORIDE 5; .6; .31; .03; .02 G/100ML; G/100ML; G/100ML; G/100ML; G/100ML
INJECTION, SOLUTION INTRAVENOUS CONTINUOUS
Status: DISCONTINUED | OUTPATIENT
Start: 2024-08-02 | End: 2024-08-07

## 2024-08-02 RX ADMIN — SODIUM CHLORIDE, SODIUM LACTATE, POTASSIUM CHLORIDE, CALCIUM CHLORIDE: 600; 310; 30; 20 INJECTION, SOLUTION INTRAVENOUS at 11:53:00

## 2024-08-02 RX ADMIN — ONDANSETRON 4 MG: 2 INJECTION INTRAMUSCULAR; INTRAVENOUS at 11:21:00

## 2024-08-02 RX ADMIN — DEXAMETHASONE SODIUM PHOSPHATE 4 MG: 4 MG/ML VIAL (ML) INJECTION at 11:21:00

## 2024-08-02 RX ADMIN — LIDOCAINE HYDROCHLORIDE 50 MG: 10 INJECTION, SOLUTION EPIDURAL; INFILTRATION; INTRACAUDAL; PERINEURAL at 11:16:00

## 2024-08-02 RX ADMIN — SODIUM CHLORIDE, SODIUM LACTATE, POTASSIUM CHLORIDE, CALCIUM CHLORIDE: 600; 310; 30; 20 INJECTION, SOLUTION INTRAVENOUS at 11:03:00

## 2024-08-02 RX ADMIN — SODIUM CHLORIDE, SODIUM LACTATE, POTASSIUM CHLORIDE, CALCIUM CHLORIDE: 600; 310; 30; 20 INJECTION, SOLUTION INTRAVENOUS at 12:06:00

## 2024-08-02 NOTE — PROGRESS NOTES
Patient transferred to mother/baby room 350   per cart in stable condition with Accompanied by  and staff.  Report given to mother/baby RN.

## 2024-08-02 NOTE — LACTATION NOTE
24 1530   Evaluation Type   Evaluation Type Inpatient   Problems identified   Problems identified Knowledge deficit   Problems Identified Other 34 3/7week gestation in [facility]   Maternal history   Maternal history AMA;Caesarean section;Anemia;Induction of labor;PIH   Other/comment Hx  demise at 15 days of life in [place] (while she was nursing, per dad), Hx preeclampsia   Breastfeeding goal   Breastfeeding goal To maintain breast milk feeding per patient goal   Maternal Assessment   Bilateral Breasts Symmetrical;Soft   Bilateral Nipples Everted;Elastic;Colostrum easily expressed   Prior breastfeeding experience (comment below) Multip   Prior BF experience: comment Hx  demise at 15 days of life in [place] (while she was nursing, per dad)   Breastfeeding Assistance Pumping assistance provided with permission;Breast exam provided with permission   Pain assessment   Location/Comment denies   Guidelines for use of:   Breast pump type Ameda Aspen  (mom very sleepy, did not ask if had a pump)   Current use of pump: started pumping   Suggested use of pump Pump 8-12X/24hr   Other (comment) Mom with hx of  demise at 15 days of life in [place] (while she was nursing, per dad), has delivered a 34 3/7 week gestation girl who is in [facility], Ameda pump was set up with suction set at 50 per comfort level, was able to pump drops of milk, instructed to label and take to [facility], since mom seemed very sleepy, I showed the dad how to set up the pump and how to clean pump parts, informed of the [place] [facility], encouraged to call if needed.

## 2024-08-02 NOTE — ANESTHESIA POSTPROCEDURE EVALUATION
Patient: Xu Martinez    Procedure Summary       Date: 24 Room / Location: [facility] L+D OR 03 / [facility] L+D OR    Anesthesia Start: 1103 Anesthesia Stop: 1217    Procedure:  SECTION (Abdomen) Diagnosis: (same as preop)    Surgeons: Angie Pompa MD Anesthesiologist: Tadeo Hand MD    Anesthesia Type: general ASA Status: 2 - Emergent            Anesthesia Type: No value filed.    Vitals Value Taken Time   /92 24 1216   Temp 97.2 °F (36.2 °C) 24 1214   Pulse 89 24 1217   Resp 24 24 1217   SpO2 100 % 24 1217   Vitals shown include unfiled device data.    [facility] AN Post Evaluation:   Patient Evaluated in PACU  Patient Participation: complete - patient participated  Level of Consciousness: awake and alert  Pain Score: 5  Pain Management: adequate  Airway Patency:patent  Yes    Nausea/Vomiting: none  Cardiovascular Status: acceptable  Respiratory Status: acceptable  Postoperative Hydration acceptable      TADEO HAND MD  2024 12:17 PM

## 2024-08-02 NOTE — ANESTHESIA PROCEDURE NOTES
Airway  Date/Time: 8/2/2024 11:17 AM  Urgency: Elective    Airway not difficult    General Information and Staff    Patient location during procedure: OR  Anesthesiologist: Sukumar Hand MD  Performed: anesthesiologist   Performed by: Sukumar Hand MD  Authorized by: Sukumar Hand MD      Indications and Patient Condition  Indications for airway management: anesthesia  Sedation level: deep  Preoxygenated: yes  Patient position: sniffing  MILS maintained throughout  Mask difficulty assessment: 1 - vent by mask    Final Airway Details  Final airway type: endotracheal airway      Successful airway: ETT  Cuffed: yes   Successful intubation technique: direct laryngoscopy  Facilitating devices/methods: cricoid pressure, rapid sequence intubation and intubating stylet  Endotracheal tube insertion site: oral  Blade: Tate  Blade size: #3  ETT size (mm): 7.0    Cormack-Lehane Classification: grade I - full view of glottis  Placement verified by: capnometry   Cuff volume (mL): 10  Measured from: teeth  ETT to teeth (cm): 22  Number of attempts at approach: 1  Number of other approaches attempted: 0

## 2024-08-02 NOTE — PROGRESS NOTES
CTSP by RN about FHT's with variable and late decels.   Patient is c/o painful contractions  35 y/o  at 34 W admitted for bp monitoring.   FHT's 130's moderate variability with both late and variable decels  SVE 5/70/-3  AROM with clear fluid.   IUPC placed  Plan to start amnioinfusion.   Patient appears to be laboring spontaneously  Will see if amnioinfusion will halt decels.   Bp stable.

## 2024-08-02 NOTE — BRIEF OP NOTE
Pre-Operative Diagnosis: Fetal intolerance to labor     Post-Operative Diagnosis: same as preop      Procedure Performed:    SECTION    Surgeons and Role:     * Angie Pompa MD - Primary    Assistant(s):        Surgical Findings: Viable female infant with cord tied around feet. Normal tubes ovaries bilaterally     Specimen: placenta to path     Estimated Blood Loss: No data recorded    Component      Latest Ref Rng 2024   SAMPLE SITE Cord    CORD ARTERIAL PH      7.18 - 7.38  7.20    CORD ARTERIAL PCO2      32 - 66 mm Hg 57    CORD ARTERIAL PO2      6 - 30 mm Hg 18    CORD ARTERIAL HCO3      17.0 - 27.0 mmol/L 17.8    CORD ARTERIAL BASE EXCESS      mmol/L -6.4    Blood Gas Analyzer [id number] FBC    Blood Gas Analyzer [id number] FBC    VENOUS SAMPLE SITE Cord    CORD VENOUS PH      7.25 - 7.45  7.20 (L)    CORD VENOUS PCO2      27 - 49 mm Hg 51 (H)    CORD VENOUS HCO3      16.0 - 25.0 mmol/L 16.7    CORD VENOUS PO2      17 - 41 mm Hg 27    CORD VENOUS BASE EXCESS      mmol/L -8.4       Legend:  (L) Low  (H) High    Angie Pompa MD  2024  12:40 PM

## 2024-08-02 NOTE — ANESTHESIA PREPROCEDURE EVALUATION
Anesthesia PreOp Note    HPI:     Xu Martinez is a 34 year old female who presents for preoperative consultation requested by: Angie Pompa MD    Date of Surgery: 2024    Procedure(s):   SECTION  Indication: * No pre-op diagnosis entered *    Relevant Problems   No relevant active problems       NPO:                         History Review:  Patient Active Problem List    Diagnosis Date Noted     labor in third trimester with  delivery ([facility]) 2024    Pre-eclampsia in third trimester ([facility]) 2024    Pregnancy ([facility]) 2024    Mild pre-eclampsia in third trimester ([facility]) 2024    AMA (advanced maternal age) multigravida 35+ ([facility]) 2024    Prediabetes 2024    Anemia in pregnancy, third trimester ([facility]) 2024    History of  death 2024    Heartburn during pregnancy in second trimester ([facility]) 2024    Slow transit constipation 2024       History reviewed. No pertinent past medical history.    History reviewed. No pertinent surgical history.    Medications Prior to Admission   Medication Sig Dispense Refill Last Dose    Ferrous Sulfate 325 (65 Fe) MG Oral Tab Take 1 tablet (325 mg total) by mouth every other day. 30 tablet 1 Past Week    prenatal vitamin with DHA 27-0.8-228 MG Oral Cap Take 1 capsule by mouth daily.   2024    [] clotrimazole 2 % Vaginal Cream Place 1 Applicatorful vaginally nightly for 3 days. 21 g 0     [] cephalexin (KEFLEX) 500 MG Oral Cap Take 1 capsule (500 mg total) by mouth 4 (four) times daily for 10 days. 40 capsule 0     [] famotidine (PEPCID) 20 MG Oral Tab Take 1 tablet (20 mg total) by mouth 2 (two) times daily as needed for Heartburn. (Patient not taking: Reported on 2024) 60 tablet 0     [] docusate sodium 100 MG Oral Cap Take 1 capsule (100 mg total) by mouth 2 (two) times daily. (Patient not taking: Reported on 2024) 60 capsule 0      Current  Facility-Administered Medications Ordered in Epic   Medication Dose Route Frequency Provider Last Rate Last Admin    fentaNYL (Sublimaze) 50 mcg/mL injection 50 mcg  50 mcg Intravenous Q30 Min PRN Angie Pompa MD        metoclopramide (Reglan) 5 mg/mL injection             sodium citrate-citric acid (Bicitra) 500-334 MG/5ML oral solution             oxyTOCIN in sodium chloride 0.9% (Pitocin) 30 Units/500mL infusion premix             ceFAZolin (Ancef) 2 g/10mL IV syringe premix             famotidine (Pepcid) 20 mg/2mL injection             sodium chloride 0.9% infusion   Intrauterine Once Angie Pompa  mL/hr at 08/02/24 1044 Rate Change at 08/02/24 1044    lactated ringers IV bolus 1,000 mL  1,000 mL Intravenous Once Sukumar Hand MD        fentaNYL-bupivacaine 2 mcg/mL-0.125% in sodium chloride 0.9% 100 mL EPIDURAL infusion premix   Epidural Continuous Sukumar Hand MD        fentaNYL (Sublimaze) 50 mcg/mL injection 100 mcg  100 mcg Epidural Once Sukumar Hand MD        bupivacaine PF (Marcaine) 0.25% injection  20 mL Epidural Once Sukumar Hand MD        EPHEDrine (PF) 25 MG/5 ML injection 5 mg  5 mg Intravenous PRN Sukumar Hand MD        nalbuphine (Nubain) 10 mg/mL injection 2.5 mg  2.5 mg Intravenous Q15 Min PRN Sukumar Hand MD        enoxaparin (Lovenox) 40 MG/0.4ML SUBQ injection 40 mg  40 mg Subcutaneous Daily Angie Pompa MD        oxyTOCIN in sodium chloride 0.9% (Pitocin) 30 Units/500mL infusion premix  62.5-900 shashank-units/min Intravenous Continuous Angie Pompa MD        famotidine (Pepcid) tab 20 mg  20 mg Oral Once Angie Pompa MD        Or    famotidine (Pepcid) 20 mg/2mL injection 20 mg  20 mg Intravenous Once Angie Pompa MD        ceFAZolin (Ancef) 2g in 10mL IV syringe premix  2 g Intravenous Once Angie Pompa MD        azithromycin  (Zithromax) 500 mg in sodium chloride 0.9% 250mL IVPB premix  500 mg Intravenous 30 Min Pre-Op Angie Pompa MD        acetaminophen (Tylenol Extra Strength) tab 1,000 mg  1,000 mg Oral Q6H PRN Yamhill, Lindy, CNM   1,000 mg at 08/01/24 1147    lactated ringers infusion   Intravenous Continuous Yamhill, Lindy,  mL/hr at 08/02/24 0800 New Bag at 08/02/24 0800    acetaminophen (Tylenol Extra Strength) tab 500 mg  500 mg Oral Q6H PRN Yamhill, Lindy, CNM        Or    acetaminophen (Tylenol Extra Strength) tab 1,000 mg  1,000 mg Oral Q6H PRN Yamhill, Lindy, CNM        zolpidem (Ambien) tab 5 mg  5 mg Oral Nightly PRN Yamhill, Lindy, CNM        docusate sodium (Colace) cap 100 mg  100 mg Oral BID Yamhill, Lindy, CNM   100 mg at 08/01/24 2023    calcium carbonate (Tums) chewable tab 1,000 mg  1,000 mg Oral Daily PRN Otis, Lindy, CNM   1,000 mg at 08/01/24 2023    prenatal vitamin with DHA (PNV with DHA) cap 1 capsule  1 capsule Oral Daily Yamhill, Lindy, CNM   1 capsule at 08/01/24 2023    betamethasone sodium phosphate & acetate (Celestone) 6 (3-3) MG/ML injection 12 mg  12 mg Intramuscular Q24H Yamhill, Lindy, CNM   12 mg at 08/01/24 1400    calcium gluconate injection 1 g  1 g Intravenous Once PRN Gail Elam MD         No current [facility]-ordered outpatient medications on file.       No Known Allergies    History reviewed. No pertinent family history.  Social History     Socioeconomic History    Marital status:    Tobacco Use    Smoking status: Never    Smokeless tobacco: Never   Vaping Use    Vaping status: Never Used       Available pre-op labs reviewed.  Lab Results   Component Value Date    WBC 16.9 (H) 08/02/2024    RBC 4.57 08/02/2024    HGB 11.0 (L) 08/02/2024    HCT 33.6 (L) 08/02/2024    MCV 73.5 (L) 08/02/2024    MCH 24.1 (L) 08/02/2024    MCHC 32.7 08/02/2024    RDW 15.4 (H) 08/02/2024    .0 08/02/2024     Lab Results   Component Value Date     08/02/2024    K 4.3 08/02/2024      2024    CO2 17.0 (L) 2024    BUN 9 2024    CREATSERUM 0.56 2024    GLU 92 2024    CA 9.1 2024          Vital Signs:  Body mass index is 31.41 kg/m².   weight is 83 kg (183 lb). Her oral temperature is 98.4 °F (36.9 °C). Her blood pressure is 139/72 and her pulse is 64. Her respiration is 18 and oxygen saturation is 93%.   Vitals:    24 0616 24 0630 24 0645 24 0742   BP:    139/72   Pulse: 52 56 56 64   Resp:       Temp:    98.4 °F (36.9 °C)   TempSrc:    Oral   SpO2:  93% 93%    Weight:            Anesthesia Evaluation     Patient summary reviewed and Nursing notes reviewed    No history of anesthetic complications   Airway   Mallampati: II  TM distance: >3 FB  Neck ROM: full  Dental - Dentition appears grossly intact     Pulmonary - negative ROS and normal exam   Cardiovascular - negative ROS and normal exam  Exercise tolerance: good    Neuro/Psych - negative ROS     GI/Hepatic/Renal - negative ROS     Endo/Other - negative ROS   Abdominal  - normal exam                 Anesthesia Plan:   ASA:  2  Emergent    Plan:   General  Airway:  ETT  Post-op Pain Management: IV analgesics and Oral pain medication  Informed Consent Plan and Risks Discussed With:  Patient      I have informed Xu Martinez of the nature of the anesthetic plan, benefits, risks including possible dental damage if relevant, major complications, and any alternative forms of anesthetic management.   All of the patient's questions were answered to the best of my ability. The patient desires the anesthetic management as planned.  TADEO BARNES MD  2024 11:04 AM  Present on Admission:   Pre-eclampsia in third trimester (HCC)   Pregnancy (HCC)   Mild pre-eclampsia in third trimester (HCC)    labor in third trimester with  delivery (HCC)

## 2024-08-02 NOTE — CM/SW NOTE
SW order for SDOH.  Resources provided to FOB as pt was uncomfortably laboring.  SW confirmed that infant will have car seat and necessities.    SW/CM to remain available for support and/or discharge planning.      Chiquis ANDERSON, LSW  Social Work/Case Management

## 2024-08-02 NOTE — PLAN OF CARE

## 2024-08-03 PROBLEM — D62 ABLA (ACUTE BLOOD LOSS ANEMIA): Status: ACTIVE | Noted: 2024-08-03

## 2024-08-03 LAB
BASOPHILS # BLD AUTO: 0.02 X10(3) UL (ref 0–0.2)
BASOPHILS NFR BLD AUTO: 0.1 %
DEPRECATED RDW RBC AUTO: 43.5 FL (ref 35.1–46.3)
EOSINOPHIL # BLD AUTO: 0 X10(3) UL (ref 0–0.7)
EOSINOPHIL NFR BLD AUTO: 0 %
ERYTHROCYTE [DISTWIDTH] IN BLOOD BY AUTOMATED COUNT: 15.4 % (ref 11–15)
GROUP B STREP BY PCR FOR PCR OVT: NEGATIVE
HCT VFR BLD AUTO: 22.4 %
HCT VFR BLD AUTO: 25.3 %
HGB BLD-MCNC: 6.9 G/DL
HGB BLD-MCNC: 8.2 G/DL
IMM GRANULOCYTES # BLD AUTO: 0.26 X10(3) UL (ref 0–1)
IMM GRANULOCYTES NFR BLD: 1.4 %
LYMPHOCYTES # BLD AUTO: 3.92 X10(3) UL (ref 1–4)
LYMPHOCYTES NFR BLD AUTO: 20.6 %
MCH RBC QN AUTO: 24.2 PG (ref 26–34)
MCHC RBC AUTO-ENTMCNC: 30.8 G/DL (ref 31–37)
MCV RBC AUTO: 78.6 FL
MONOCYTES # BLD AUTO: 1.2 X10(3) UL (ref 0.1–1)
MONOCYTES NFR BLD AUTO: 6.3 %
NEUTROPHILS # BLD AUTO: 13.59 X10 (3) UL (ref 1.5–7.7)
NEUTROPHILS # BLD AUTO: 13.59 X10(3) UL (ref 1.5–7.7)
NEUTROPHILS NFR BLD AUTO: 71.6 %
PLATELET # BLD AUTO: 197 10(3)UL (ref 150–450)
RBC # BLD AUTO: 2.85 X10(6)UL
WBC # BLD AUTO: 19 X10(3) UL (ref 4–11)

## 2024-08-03 PROCEDURE — 30233N1 TRANSFUSION OF NONAUTOLOGOUS RED BLOOD CELLS INTO PERIPHERAL VEIN, PERCUTANEOUS APPROACH: ICD-10-PCS | Performed by: OBSTETRICS & GYNECOLOGY

## 2024-08-03 RX ORDER — DIPHENHYDRAMINE HYDROCHLORIDE 50 MG/ML
25 INJECTION INTRAMUSCULAR; INTRAVENOUS ONCE
Status: COMPLETED | OUTPATIENT
Start: 2024-08-03 | End: 2024-08-03

## 2024-08-03 RX ORDER — SODIUM CHLORIDE 9 MG/ML
INJECTION, SOLUTION INTRAVENOUS ONCE
Status: COMPLETED | OUTPATIENT
Start: 2024-08-03 | End: 2024-08-03

## 2024-08-03 RX ORDER — GABAPENTIN 300 MG/1
300 CAPSULE ORAL 3 TIMES DAILY
Status: DISCONTINUED | OUTPATIENT
Start: 2024-08-03 | End: 2024-08-07

## 2024-08-03 NOTE — LACTATION NOTE
LACTATION NOTE - MOTHER      Evaluation Type: Inpatient    Problems identified  Problems identified: Knowledge deficit;Milk supply not WNL  Milk supply not WNL: Reduced (potential)  Problems Identified Other: 34 3/7week gestation in [facility]    Maternal history  Maternal history: AMA;Caesarean section;Anemia;Induction of labor;PIH    Breastfeeding goal  Breastfeeding goal: To maintain breast milk feeding per patient goal    Maternal Assessment  Bilateral Breasts: Soft;Symmetrical  Bilateral Nipples: WNL  Prior breastfeeding experience (comment below): Multip  Prior BF experience: comment: Hx  demise at 15 days of life in [place] (while she was nursing, per dad)  Breastfeeding Assistance: Hand expression provided with permission;Pumping assistance provided with permission    Pain assessment  Location/Comment: denies  Treatment of Sore Nipples: Lanolin    Guidelines for use of:  Breast pump type: Ameda Platinum;Hand Pump  Suggested use of pump: Pump 8-12X/24hr  Reported pumping volumes (ml): 5-6 ml              Infant in [facility]. Discussed pump settings, assembly, and proper flange size.  Educated patient about supply/demand and the importance of frequent stimulation. Encouraged to call LC if assistance with breastfeeding is needed.

## 2024-08-03 NOTE — DISCHARGE INSTRUCTIONS
What to Expect:  Abdominal cramping after delivery especially if you are breastfeeding.   Vaginal bleeding for about 4-6 weeks that may be followed by a yellow or white discharge for a few more weeks.  Your period will resume in approximately 6-8 weeks, unless you are breastfeeding.    If you are breastfeeding, nipple dryness is very common the first few days.    Constipation is common after having a baby.  Please increase fluid and fiber in your diet.       Over-The-Counter Medication  Non-prescription anti-inflammatory medications can also help to ease the pain.  You may take Tylenol or Ibuprofen   Colace or Metamucil for Constipation  Lanolin for dry nipples    Bathing/Showers  You may resume showers  No baths, swimming, hot tubs until your post-partum visit     Home Medication  Resume your home medications as instructed     Diet  Resume your normal diet     Activity  Refrain from vaginal intercourse, vaginal suppositories, tampon use or douches until after your post-partum visit.  No exercising for 4 weeks  Do not do heavy housework for at least 2-3 weeks        Questions or Concerns  Call your obstetrician’s office if you experience the following:  Severe pain not controlled by pain medication  Foul smelling vaginal discharge  Heavy bleeding  Shortness of breath  Fever  Redness, increased swelling or drainage from your incision  Crying and periods of sadness that prevents you from caring for yourself and your baby  Burning sensation during urination or inability to urinate  Swelling, redness or abnormal warmth to your leg/calf              [facility] has great support for our families even after discharge.  We have virtual or in-person support groups.  Visit our website for the most up-to-date info for our many different support groups. [email or web address]       Outpatient Lactation appoints.  Call [phone]- to schedule an appt.  Our office is located in the  Maternal Fetal Medicine office next to [facility] on the first floor.      Moms Support Groups  Our weekly New Mom Support Groups are for any new parents in our community. They are led by an experienced Mother/Baby nurse or IBCLC and usually include a guest speaker on a topic of interest to new parents. These in-person groups also include Breastfeeding Support at each meeting. Bring your baby ( - 6 months) with you! Moms-to-be are also welcome! All mom's welcome even if its not your first.     MOM & BABY HOUR   Meets most  10:00 - 11:30 a.m.  Masks are not required, but be considerate of others and do not attend if mom or baby have had any symptoms of illness within the previous 24 hours. Breastfeeding support will be included at each session--just ask the leader any breastfeeding questions you may have. [facility] [place] [address] [place] Go inside the front door and to the right to the “Community Education Room”.    Mom's Line: [phone]  A phone line dedicated for women (or anyone worried about a women) who may be experiencing signs or symptoms of postpartum depression, anxiety, or overwhelmed with new baby. Call - answered by live trained mental health professionals, free and confidential, emotional support, referrals, in any language.    Nurturing Mom- A support group for new and expectant moms looking for support with the transition to parenthood as well as those experiencing symptoms of  anxiety and/or depression.  Please contact [email or web address] if you need directions or the link for the virtual meetings. Please contact [email or web address] if you plan to attend, but please be considerate of others and do not attend if mom or baby have had any symptoms of illness within the previous 24 hours.     La Leche League for breast feeding and parent support, Website: IIIus.org  and for the [place] group and other groups visit  [email or web address]/[email or web address]/events/.  to help find a group, all meetings are virtual.     Facebook groups-  for more support when home- [facility]ies & Mommies of [facility] --- you can find mom-to-mom advice and the list of speaker topics for cradle talk program.  Telephone Support  Postpartum depression [place] of [place] (www.ppdil.org)  -Free information and support for pregnant and postpartum women with symptoms of depression, anxiety  -Mom-to-mom support from volunteers who have been through depression    Telephone support: (987) [phone]  Urgent support:(886)-897-0339 (answered 24/7)  Email support: support@ppdil.org    Postpartum Support International (www.postpartum.net)  -Free phone conversation with trained volunteers   (298) 230-9179; after the prompt enter 99181#    National Postpartum Depression Hotline  (525)-PPD-MOMS    Helpful websites:    www.[employer]  www.Movidius  www.Breastfeed[place].org    Initiation of breast pumping after discharge:     - Store the breast milk in 2-3 oz containers.     - Label containers with date and time.    - Always feed oldest milk first.             Additional Resources:  To extend a nonimmigrant or tourist visa, the mother can apply while still in the [place]. It's best to file at least 45 days before the visa expires. The fee schedule for Form I-539 can be used to determine the cost. If the mother has proof of filing the I-539 application, she can stay in the [place] for up to 240 days after the expiration date on her I-94.     I-539, Application to Extend/Change Nonimmigrant Status    The following groups use this form:    Certain nonimmigrants extending their stay or changing to another nonimmigrant status;  [place] ([facility]) residents applying for an initial tiffany of status;  F and M nonimmigrants applying for reinstatement; and,  Persons seeking V nonimmigrant status or an extension of stay as a V  nonimmigrant.  You must carefully review the Form I-539 filing instructions (PDF, 437.34 KB) before submitting your request to ensure you are filing your request for an extension or change of status using the proper form. (Form can be found online)    Forms and additional information can be found https://myaccount.uscis.gov/sign-in      Post  Section Home Care Instructions     We hope you were pleased with your care at [facility].  We wish you the best outcome and overall experience with the delivery of your baby.  These instructions will help to minimize pain, limit the risk for an infection, and improve the likelihood of a successful recovery.    What to Expect:  Abdominal cramping after delivery especially if you are breastfeeding.   Vaginal bleeding for about 4-6 weeks that may be followed by a yellow or white discharge for a few more weeks.  Your period will resume in approximately 6-8 weeks, unless you are breastfeeding.    If you are bottle feeding, you may notice breast engorgement in about 3 days.  Your breast may be sore and hard. Please wear a tight fitted bra or sports bra for 24-36 hours to help prevent your breast from producing milk, and use ice packs to relive any discomfort.  If you are breastfeeding, nipple dryness is very common the first few days.    Constipation is common after having a baby.  Please increase fluid and fiber in your diet.      Over-The-Counter Medication  Non-prescription anti-inflammatory medications can also help to ease the pain.  You may take Aleve, Tylenol or Ibuprofen   Colace or Metamucil for Constipation  Lanolin for dry nipples  Tucks, Witch Hazel and Epifoam for vaginal/perineum discomfort.   Drink a full glass of water with oral medication and take as directed.    Wound Care  The following instructions will promote proper healing and help to prevent infection  Please use soap and water over incision   Pat your incision dry and leave open to air  if possible   If you have steri - strips, then please remove after 4-5 days from your surgery. You may remove after a shower to decrease discomfort.   Do not replace the Steri-Strips, if they come off.  If the tapes come off, leave them off and keep the incision clean.  You do not need to cover the incision or put any medications on the incision.    Bathing/Showers  You may resume showers  No baths, swimming, hot tubs until your post-partum visit    Home Medication  Resume your home medications as instructed    Diet  Resume your normal diet    Activity  Refrain from vaginal intercourse, vaginal suppositories, tampon use or douches until after your post-partum visit  No exercising for 4 weeks  You may climb stairs minimally for the 1st week.    Do not do heavy housework for at least 2-3 weeks    Return to Work or School  You may return to work in 6-8 weeks  Contact your obstetrician’s office, if you need a medical release. ([phone])    Driving  Avoid driving for 1-2 weeks or sooner if not taking narcotics.    Follow-up Appointment with Your Obstetrician  Call your obstetrician’s office today for an appointment in four weeks.    The number is [phone].  Verify your appointment date, day, time, and location.  At your 1st post-partum office visit:  Your progress will be evaluated, findings reviewed, and any additional concerns and instructions will be discussed.    Questions or Concerns  Call your obstetrician’s office if you experience the following:  Severe pain not controlled by pain medication  Foul smelling vaginal discharge  Heavy bleeding  Shortness of breath  Fever  Redness, increased swelling or drainage from your incision  Crying and periods of sadness that prevents you from caring for yourself and your baby  Burning sensation during urination or inability to urinate  Swelling, redness or abnormal warmth to your leg/calf  Please call [phone]. If your call is made after office hours, a physician  will be available to help you.  There is always a provider covering our patients.    Thank you for coming to [facility] to start your new family.  The nurses, obstetricians, and the anesthesiologists try very hard to make sure you receive the best care possible.  Your trust in them as well as us is greatly appreciated.    Thanks so much,   The Providers of [facility]

## 2024-08-03 NOTE — PLAN OF CARE
Problem: POSTPARTUM  Goal: Long Term Goal:Experiences normal postpartum course  Description: INTERVENTIONS:  - Assess and monitor vital signs and lab values.  - Assess fundus and lochia.  - Provide ice/sitz baths for perineum discomfort.  - Monitor healing of incision/episiotomy/laceration, and assess for signs and symptoms of infection and hematoma.  - Assess bladder function and monitor for bladder distention.  - Provide/instruct/assist with pericare as needed.  - Provide VTE prophylaxis as needed.  - Monitor bowel function.  - Encourage ambulation and provide assistance as needed.  - Assess and monitor emotional status and provide social service/psych resources as needed.  - Utilize standard precautions and use personal protective equipment as indicated. Ensure aseptic care of all intravenous lines and invasive tubes/drains.  - Obtain immunization and exposure to communicable diseases history.  2024 by Carmita Sexton RN  Outcome: Progressing  2024 by Carmita Sexton RN  Outcome: Progressing  Goal: Establishment of adequate milk supply with medication/procedure interruptions  Description: INTERVENTIONS:  - Review techniques for milk expression (breast pumping).   - Provide pumping equipment/supplies, instructions, and assistance until it is safe to breastfeed infant.  2024 by Carmita Sexton RN  Outcome: Progressing  2024 by Carmita Sexton RN  Outcome: Progressing  Goal: Appropriate maternal -  bonding  Description: INTERVENTIONS:  - Assess caregiver- interactions.  - Assess caregiver's emotional status and coping mechanisms.  - Encourage caregiver to participate in  daily care.  - Assess support systems available to mother/family.  - Provide /case management support as needed.  2024 by Carmita Sexton RN  Outcome: Progressing  2024 by Carmita Sexton RN  Outcome: Progressing

## 2024-08-03 NOTE — PLAN OF CARE
Dr Giordano on Unit,  Discussed low h/h.  Orders for 1 Unit of PRBC,  discontinue PCA, Q 8Gabapentin, 4 hours post h/h.  Keep castellano in until output increases.  Discussed plan of care with patient and .  All questions answered.  Verbally aggreed to blood transfusion.

## 2024-08-04 LAB
BLOOD TYPE BARCODE: 5100
UNIT VOLUME: 350 ML

## 2024-08-04 NOTE — PLAN OF CARE
Problem: POSTPARTUM  Goal: Long Term Goal:Experiences normal postpartum course  Description: INTERVENTIONS:  - Assess and monitor vital signs and lab values.  - Assess fundus and lochia.  - Provide ice/sitz baths for perineum discomfort.  - Monitor healing of incision/episiotomy/laceration, and assess for signs and symptoms of infection and hematoma.  - Assess bladder function and monitor for bladder distention.  - Provide/instruct/assist with pericare as needed.  - Provide VTE prophylaxis as needed.  - Monitor bowel function.  - Encourage ambulation and provide assistance as needed.  - Assess and monitor emotional status and provide social service/psych resources as needed.  - Utilize standard precautions and use personal protective equipment as indicated. Ensure aseptic care of all intravenous lines and invasive tubes/drains.  - Obtain immunization and exposure to communicable diseases history.  Outcome: Progressing  Goal: Establishment of adequate milk supply with medication/procedure interruptions  Description: INTERVENTIONS:  - Review techniques for milk expression (breast pumping).   - Provide pumping equipment/supplies, instructions, and assistance until it is safe to breastfeed infant.  Outcome: Progressing  Goal: Appropriate maternal -  bonding  Description: INTERVENTIONS:  - Assess caregiver- interactions.  - Assess caregiver's emotional status and coping mechanisms.  - Encourage caregiver to participate in  daily care.  - Assess support systems available to mother/family.  - Provide /case management support as needed.  Outcome: Progressing     Problem: COPING  Goal: Pt/Family able to verbalize concerns and demonstrate effective coping strategies  Description: INTERVENTIONS:  - Assist patient/family to identify coping skills, available support systems and cultural and spiritual values  - Provide emotional support, including active listening and acknowledgement of  concerns of patient and caregivers  - Reduce environmental stimuli, as able  - Instruct patient/family in relaxation techniques, as appropriate  - Assess for spiritual and psychosocial needs and initiate Spiritual Care or Behavioral Health consult as needed  Outcome: Progressing

## 2024-08-04 NOTE — PROGRESS NOTES
[facility]  Obstetrics and Gynecology    OB/GYN: Postpartum Progress Note     SUBJECTIVE:  Patient is a 34 year old  female who is s/p PLTCS. She is POD# 2.   Doing well. Noted pain is moderately controlled. Denies fever, chills, N, V, chest pain and SOB. Bleeding has been stable.  Voiding without difficulty.  Passing flatus.  No Bm. Tolerating general diet. Ambulating without difficulty.     OBJECTIVE:  Vitals:    24 0000 24 0001 24 0424 24 0800   BP: 132/80 132/80 128/84 123/83   Pulse: 77 77 76 93   Resp:  18 18 18   Temp:    97.6 °F (36.4 °C)   TempSrc:    Oral   SpO2:       Weight:           Physical Exam:  Lungs:   Respirations non labored   Cardiovascular:   Peripheral pulses +2 bilaterally      General:    AAA. NAD.    Abdomen Soft, nontender, nondistended   Lochia:  appropriate   Uterine Fundus:   firm below the umbilicus   Incision:  healing well, no significant drainage, no dehiscence, no significant erythema with tegaderm in place    DVT Evaluation:  No evidence of DVT seen on physical exam.  Negative Haja's sign.  No cords or calf tenderness.  No significant calf/ankle edema.     Recent Labs   Lab 24  1129 24  0933 24  0531 24  1616   RBC 4.70 4.57 2.85*  --    HGB 11.3* 11.0* 6.9* 8.2*   HCT 36.5 33.6* 22.4* 25.3*   MCV 77.7* 73.5* 78.6*  --    MCH 24.0* 24.1* 24.2*  --    MCHC 31.0 32.7 30.8*  --    RDW 15.4* 15.4* 15.4*  --    NEPRELIM 4.91 12.59* 13.59*  --    WBC 8.9 16.9* 19.0*  --    .0 235.0 197.0  --            ASSESSMENT/PLAN:  Patient is a 34 year old  female who is s/p PLTCS POD# 2.     Doing well   Continue routine postpartum care  Vitals per routine   Encourage ambulation and IS use     ABLA  - s/p transfusion 1 unit RBC yesteray, repeat CBC showed appropriate rise.    Social  - patient does not have a place to stay outside the hospital. Nurse has notified social work to help provide resources once  her discharge is appropriate    Plan discharge on POD#3-4    DO ANAT López

## 2024-08-04 NOTE — PLAN OF CARE
Problem: POSTPARTUM  Goal: Establishment of adequate milk supply with medication/procedure interruptions  Description: INTERVENTIONS:  - Review techniques for milk expression (breast pumping).   - Provide pumping equipment/supplies, instructions, and assistance until it is safe to breastfeed infant.  Outcome: Progressing  Goal: Appropriate maternal -  bonding  Description: INTERVENTIONS:  - Assess caregiver- interactions.  - Assess caregiver's emotional status and coping mechanisms.  - Encourage caregiver to participate in  daily care.  - Assess support systems available to mother/family.  - Provide /case management support as needed.  Outcome: Progressing

## 2024-08-05 PROBLEM — Z34.90 PREGNANCY (HCC): Status: RESOLVED | Noted: 2024-08-01 | Resolved: 2024-08-05

## 2024-08-05 PROBLEM — D62 ACUTE BLOOD LOSS ANEMIA: Chronic | Status: ACTIVE | Noted: 2024-08-03

## 2024-08-05 PROBLEM — D62 ACUTE BLOOD LOSS ANEMIA: Status: ACTIVE | Noted: 2024-08-03

## 2024-08-05 PROBLEM — O14.03 MILD PRE-ECLAMPSIA IN THIRD TRIMESTER (HCC): Chronic | Status: ACTIVE | Noted: 2024-08-01

## 2024-08-05 PROBLEM — Z87.59 HISTORY OF NEONATAL DEATH: Chronic | Status: ACTIVE | Noted: 2024-06-05

## 2024-08-05 PROBLEM — O14.93 PRE-ECLAMPSIA IN THIRD TRIMESTER (HCC): Chronic | Status: ACTIVE | Noted: 2024-08-01

## 2024-08-05 RX ORDER — LABETALOL HYDROCHLORIDE 5 MG/ML
20 INJECTION, SOLUTION INTRAVENOUS ONCE
Status: COMPLETED | OUTPATIENT
Start: 2024-08-05 | End: 2024-08-05

## 2024-08-05 RX ORDER — NIFEDIPINE 30 MG/1
30 TABLET, EXTENDED RELEASE ORAL DAILY
Status: DISCONTINUED | OUTPATIENT
Start: 2024-08-05 | End: 2024-08-07

## 2024-08-05 RX ORDER — LABETALOL HYDROCHLORIDE 5 MG/ML
INJECTION, SOLUTION INTRAVENOUS
Status: DISPENSED
Start: 2024-08-05 | End: 2024-08-05

## 2024-08-05 NOTE — PLAN OF CARE
Problem: POSTPARTUM  Goal: Long Term Goal:Experiences normal postpartum course  Description: INTERVENTIONS:  - Assess and monitor vital signs and lab values.  - Assess fundus and lochia.  - Provide ice/sitz baths for perineum discomfort.  - Monitor healing of incision/episiotomy/laceration, and assess for signs and symptoms of infection and hematoma.  - Assess bladder function and monitor for bladder distention.  - Provide/instruct/assist with pericare as needed.  - Provide VTE prophylaxis as needed.  - Monitor bowel function.  - Encourage ambulation and provide assistance as needed.  - Assess and monitor emotional status and provide social service/psych resources as needed.  - Utilize standard precautions and use personal protective equipment as indicated. Ensure aseptic care of all intravenous lines and invasive tubes/drains.  - Obtain immunization and exposure to communicable diseases history.  Outcome: Progressing  Goal: Establishment of adequate milk supply with medication/procedure interruptions  Description: INTERVENTIONS:  - Review techniques for milk expression (breast pumping).   - Provide pumping equipment/supplies, instructions, and assistance until it is safe to breastfeed infant.  Outcome: Progressing

## 2024-08-05 NOTE — CM/SW NOTE
SW self referral due to finances/WIC resources, SDOH    SW met with patient and DARIO bedside.  SW confirmed face sheet contact as correct.  Pt endorses residing with her uncle and family at address on facesheet. Pt endorses that she will return there at discharge.  Pt reports that this is stable housing for her and her family.    Baby boy/girl name:Baby girl Emiliana  Date & time of delivery: 24  Delivery method: section  Siblings age: 8 yr old    Patient employed: Denied  Length of maternity leave:n/a    Father of baby employed:Denied  Length of paternity leave:n/a    Breast or formula feed:Breast feed    Pediatrician:DERRICK  SW encouraged pt to schedule infant first appointment (usually within 48 hours of discharge) prior to pt discharge. Pt expressed understanding.     Infant Insurance:Medicaid [place] [facility] contacted:Yes    Mental Health History: Denied    Medications:n/a    Therapist:n/a    Psychiatrist:n/a    SW discussed signs, symptoms and risks associated with post partum depression & anxiety.  SW provided pt with PMAD resources.  Other resources provided:[place] and [facility] specific [facility] resources.  Pt endorese that she has a carserat for infant.  Patient support system:FOB and extended family.    Patient denied current questions/needs from SW.    SW/CM to remain available for support and/or discharge planning.      Chiquis ANDERSON, LSW  Social Work   Ext:#[phone]

## 2024-08-05 NOTE — BH PROGRESS NOTE
GLENIS PPD SCREENING    Reason for Referral: EPDS = 17; Question # 10 = 0    Current Stressors:    History of PPD: Pt denies hx of PPD. Originally from [place], patient has expressed significant stress related to her current situation.    Financial: Pt denies.    Other: Patient is experiencing anxiety about the possibility of having to return home, which she does not want to do. Patient appears fearful of returning to [place] due to concerns about healthcare quality and safety of her child. Her VISA ends in November. Special nursery requirements have added to pt stress level.     Mental Health Status:    Current Symptoms: Anxiety, increased stress   Appearance/General Behavior: Cooperative and calm, no abnormalities.   General Cognitive Functioning: No impairment.   Thought Process: Linear and logical    Thought Content: No evidence of delusions, paranoia, AVH.    Mood/Affect: Anxious; congruent w/ mood    Orientation: AOX4   Speech: Normal in rate, rhythm, volume   Homicidal/Suicidal Ideation/Plan: Pt denies SI/HI.    Living Arrangement:    Who Resides at Home: FOB   Has other Children: Yes- 8 year old     Is Father of the Baby involved: Yes    Has Familial Support: Yes    Has Other Support Network: Sister in law, extended family     Plan:    Provide PPD Information:     Postpartum Depression Resources Given: Yes    Cradle Talk Information Given: Yes    Depression During and After Pregnancy Information given: Yes   Provide GLENIS Contact Information: Yes   Other Information Given: Patient requesting information re: [place]. [facility] provided information regarding support services available to help navigate her situation. I-539, Application to Extend/Change Nonimmigrant Status retrieved from [employer] and provided  in pt discharge summary.  Pt declined additional referrals at this time.       HIRAL Arboleda  [id number]

## 2024-08-05 NOTE — PROGRESS NOTES
No HA, visual changes or RUQ pain. Tolerating general diet, ambulating well.     Patient Vitals for the past 24 hrs:   BP Temp Temp src Pulse Resp   08/05/24 0933 145/88 -- -- 78 --   08/05/24 0901 (!) 168/107 -- -- 69 16   08/05/24 0837 160/80 98 °F (36.7 °C) Oral 62 16   08/05/24 0420 (!) 138/93 -- -- 67 16   08/05/24 0000 142/90 -- -- 74 16   08/04/24 2000 143/88 98.5 °F (36.9 °C) Oral 79 16   08/04/24 1611 149/87 -- -- 77 18     ABD: wound cdi, soft, nontender.   EXT: no calf tenderness    POD#3 stable  Hx of mild preeclampsia now with postpartum hypertension requiring 1 IV push of labetalol. Procardia XL 30 mg started today  Acute blood loss anemia s/p 1 units PRBCs. Will start IV venofer.

## 2024-08-05 NOTE — OPERATIVE REPORT
[facility]    PATIENT'S NAME: SHIELA KERR   ATTENDING PHYSICIAN: Carlos Renae MD   OPERATING PHYSICIAN: Angie Pompa MD   PATIENT ACCOUNT#:   [id number]    LOCATION:  [facility]  MEDICAL RECORD #:   [id number]       YOB: 1990  ADMISSION DATE:       2024      OPERATION DATE:  2024    OPERATIVE REPORT    PREOPERATIVE DIAGNOSIS:  Fetal intolerance to labor.  POSTOPERATIVE DIAGNOSIS:  Fetal intolerance to labor.  PROCEDURE:  Primary low transverse  section.     SURGICAL ASSISTANT:  Anali Rider M.D.    FINDINGS:  A viable female infant with cord tied around her feet.  There were normal tubes bilaterally.    COMPLICATIONS:  None.    SPECIMEN:  Placenta to Pathology.    SURGICAL FINDINGS:  Cord pH:  Her arterial pH was 7.20, pCO2 of 57, PO2 of 18, bicarb of 17.8 and base excess 6.4.  Her venous cord pH was 7.20, pCO2 of 51, bicarb of 16.7, PO2 of 27, base excess 8.4.     INDICATIONS:  Patient was taken to the operating room, placed supine on the table.  Patient is a 34-year-old, G3, P2, at 34 weeks, who was admitted for elevated blood pressures.  However, she had received betamethasone yesterday for 1 dose.  Her blood pressure is normal.  However, the patient started having contractions and was noted to have persistent deep variable decelerations.  Attempts were made to place an IUPC and amnioinfuse her uterus.  However, the variable decelerations continued and patient was only 5 cm and was taken for urgent  for fetal intolerance to labor.  Prior to procedure it was discussed with patient the procedure and the risks including bleeding, infection, damage to internal organs.     OPERATIVE TECHNIQUE:  Patient was taken to the operating room, placed supine on the table. The Bardales catheter was inserted sterilely.  The abdomen was prepped and draped in the usual sterile fashion.  A time-out was performed.  The patient was placed under general  anesthesia.  A Pfannenstiel incision was made with the use of a knife.  That incision was carried down to the level of fascia which was entered via use of a knife.  The incision was extended laterally, superiorly and inferiorly with the use of sharp and blunt dissection.  The rectus muscles were .  The peritoneum was entered bluntly.  A bladder blade was inserted.  The uterus was entered via the use of a knife.  That incision was extended bluntly.  The head was elevated into the incision and the cord was clamped.  The infant was passed off to neonatologist.  The placenta was removed manually.  The inside of the uterus was curetted with moist laparotomy sponges.  There was noted to be an extension on the right side of the uterus where there was bleeding from an arterial vessel, likely the uterine artery, which was suture ligated with a stitch of 0 Vicryl.  Hemostasis was adequate.  The uterus was closed in layers with a running and locking stitch of 0 Vicryl for the first layer and a running imbricating stitch of 0 Vicryl for the second layer.  Hemostasis was adequate.  All excessive blood was removed from the abdomen.  The fascia was closed with 1 running stitch of 0 Vicryl.  The subcutaneous tissue irrigated and hemostasis obtained with Bovie cautery.  The skin was closed with a stitch of deep subcutaneous tissue of 2-0 plain gut and a subcuticular stitch of 4-0 Vicryl.  All sponge, needle, instrument counts were correct.  Patient was taken to recovery room in stable condition.    Dictated By Angie Pompa MD  d: 08/02/2024 12:59:46  t: 08/02/2024 22:33:26  [facility] [id number]  /

## 2024-08-06 PROBLEM — O14.03 MILD PRE-ECLAMPSIA IN THIRD TRIMESTER (HCC): Chronic | Status: RESOLVED | Noted: 2024-08-01 | Resolved: 2024-08-06

## 2024-08-06 LAB
ALBUMIN SERPL-MCNC: 3.4 G/DL (ref 3.2–4.8)
ALBUMIN/GLOB SERPL: 1.3 {RATIO} (ref 1–2)
ALP LIVER SERPL-CCNC: 99 U/L
ALT SERPL-CCNC: 17 U/L
ANION GAP SERPL CALC-SCNC: 6 MMOL/L (ref 0–18)
AST SERPL-CCNC: 28 U/L (ref ?–34)
BILIRUB SERPL-MCNC: 0.3 MG/DL (ref 0.3–1.2)
BUN BLD-MCNC: 8 MG/DL (ref 9–23)
BUN/CREAT SERPL: 14.3 (ref 10–20)
CALCIUM BLD-MCNC: 9.3 MG/DL (ref 8.7–10.4)
CHLORIDE SERPL-SCNC: 109 MMOL/L (ref 98–112)
CO2 SERPL-SCNC: 27 MMOL/L (ref 21–32)
CREAT BLD-MCNC: 0.56 MG/DL
DEPRECATED RDW RBC AUTO: 44.5 FL (ref 35.1–46.3)
EGFRCR SERPLBLD CKD-EPI 2021: 122 ML/MIN/1.73M2 (ref 60–?)
ERYTHROCYTE [DISTWIDTH] IN BLOOD BY AUTOMATED COUNT: 16.4 % (ref 11–15)
GLOBULIN PLAS-MCNC: 2.7 G/DL (ref 2–3.5)
GLUCOSE BLD-MCNC: 80 MG/DL (ref 70–99)
HCT VFR BLD AUTO: 28.2 %
HGB BLD-MCNC: 8.8 G/DL
MCH RBC QN AUTO: 24.2 PG (ref 26–34)
MCHC RBC AUTO-ENTMCNC: 31.2 G/DL (ref 31–37)
MCV RBC AUTO: 77.7 FL
OSMOLALITY SERPL CALC.SUM OF ELEC: 291 MOSM/KG (ref 275–295)
PLATELET # BLD AUTO: 261 10(3)UL (ref 150–450)
POTASSIUM SERPL-SCNC: 4.7 MMOL/L (ref 3.5–5.1)
PROT SERPL-MCNC: 6.1 G/DL (ref 5.7–8.2)
RBC # BLD AUTO: 3.63 X10(6)UL
SODIUM SERPL-SCNC: 142 MMOL/L (ref 136–145)
WBC # BLD AUTO: 11.6 X10(3) UL (ref 4–11)

## 2024-08-06 RX ORDER — NIFEDIPINE 30 MG
30 TABLET, EXTENDED RELEASE ORAL DAILY
Qty: 30 TABLET | Refills: 2 | Status: SHIPPED | OUTPATIENT
Start: 2024-08-06

## 2024-08-06 RX ORDER — GABAPENTIN 300 MG/1
300 CAPSULE ORAL 3 TIMES DAILY PRN
Qty: 30 CAPSULE | Refills: 0 | Status: SHIPPED | OUTPATIENT
Start: 2024-08-06

## 2024-08-06 RX ORDER — IBUPROFEN 600 MG/1
600 TABLET ORAL EVERY 6 HOURS
Qty: 60 TABLET | Refills: 0 | Status: SHIPPED | OUTPATIENT
Start: 2024-08-06

## 2024-08-06 NOTE — PROGRESS NOTES
[facility]  Obstetrics and Gynecology    OB/GYN: Postpartum Progress Note     SUBJECTIVE:  Patient is a 34 year old  female who is s/p PLTCS. She is POD# 4, had severe range BP yesterday.   Doing well. Noted pain is well controlled. Denies fever, chills, N, V, chest pain and SOB. Bleeding has been stable.  Voiding without difficulty.  Passing flatus.  + Bm yesterday. Tolerating general diet. Ambulating without difficulty.     OBJECTIVE:  Vitals:    24 1711 24 2129 24 0116 24 0523   BP: 134/87 137/88 135/86 (!) 148/93   Pulse: 92 83 73 69   Resp:  16     Temp:  98 °F (36.7 °C)     TempSrc:  Oral     SpO2:       Weight:           Physical Exam:  Lungs:   Respirations non labored   Cardiovascular:   Peripheral pulses +2 bilaterally      General:    AAA. NAD.    Abdomen Soft, nontender, nondistended   Lochia:  appropriate   Uterine Fundus:   firm below the umbilicus   Incision:  healing well, no significant drainage, no dehiscence, no significant erythema with tegaderm in place    DVT Evaluation:  No evidence of DVT seen on physical exam.  Negative Haja's sign.  No cords or calf tenderness.  No significant calf/ankle edema.     Recent Labs   Lab 24  1129 24  0933 24  0531 24  1616   RBC 4.70 4.57 2.85*  --    HGB 11.3* 11.0* 6.9* 8.2*   HCT 36.5 33.6* 22.4* 25.3*   MCV 77.7* 73.5* 78.6*  --    MCH 24.0* 24.1* 24.2*  --    MCHC 31.0 32.7 30.8*  --    RDW 15.4* 15.4* 15.4*  --    NEPRELIM 4.91 12.59* 13.59*  --    WBC 8.9 16.9* 19.0*  --    .0 235.0 197.0  --            ASSESSMENT/PLAN:  Patient is a 34 year old  female who is s/p PLTCS POD# 4.     Doing well   Continue routine postpartum care  Vitals per routine   Encourage ambulation and IS use     ABLA  - s/p transfusion 1 unit RBC. Getting venofer    Severe range BP postpartum  - now on Nifedipine 30 mg daily - Bps as noted above.  - check CBC / CMP now  - cont to monitor - as  BP meds were started yesterday, cont to monitor likely one more night in hospital    Social  - TANISHA has helped her coordinate resources.    Plan discharge likely tomorrow (POD#5)    DO ANAT López       Addendum:  CBC and CMP normal     Latest Reference Range & Units 08/06/24 08:56   Glucose 70 - 99 mg/dL 80   Sodium 136 - 145 mmol/L 142   Potassium 3.5 - 5.1 mmol/L 4.7   Chloride 98 - 112 mmol/L 109   Carbon Dioxide, Total 21.0 - 32.0 mmol/L 27.0   BUN 9 - 23 mg/dL 8 (L)   CREATININE 0.55 - 1.02 mg/dL 0.56   CALCIUM 8.7 - 10.4 mg/dL 9.3   BUN/CREATININE RATIO 10.0 - 20.0  14.3   EGFR >=60 mL/min/1.73m2 122   ANION GAP 0 - 18 mmol/L 6   CALCULATED OSMOLALITY 275 - 295 mOsm/kg 291   ALKALINE PHOSPHATASE 37 - 98 U/L 99 (H)   AST (SGOT) <34 U/L 28   ALT (SGPT) 10 - 49 U/L 17   Total Bilirubin 0.3 - 1.2 mg/dL 0.3   Globulin 2.0 - 3.5 g/dL 2.7   A/G Ratio 1.0 - 2.0  1.3   PROTEIN, TOTAL 5.7 - 8.2 g/dL 6.1   Albumin 3.2 - 4.8 g/dL 3.4   WBC 4.0 - 11.0 x10(3) uL 11.6 (H)   Hemoglobin 12.0 - 16.0 g/dL 8.8 (L)   Hematocrit 35.0 - 48.0 % 28.2 (L)   Platelet Count 150.0 - 450.0 10(3)uL 261.0   RBC 3.80 - 5.30 x10(6)uL 3.63 (L)   MCH 26.0 - 34.0 pg 24.2 (L)   MCHC 31.0 - 37.0 g/dL 31.2   MCV 80.0 - 100.0 fL 77.7 (L)   RDW 11.0 - 15.0 % 16.4 (H)   RDW-SD 35.1 - 46.3 fL 44.5   (L): Data is abnormally low  (H): Data is abnormally high

## 2024-08-06 NOTE — LACTATION NOTE
24 1030   Evaluation Type   Evaluation Type Inpatient   Problems identified   Problems identified Knowledge deficit   Problems Identified Other 34 3/7week gestation in [facility]   Maternal history   Maternal history AMA;Caesarean section;Anemia;Induction of labor;PIH   Other/comment Hx  demise at 15 days of life in [place] (while she was nursing, per dad), Hx preeclampsia   Breastfeeding goal   Breastfeeding goal To maintain breast milk feeding per patient goal   Maternal Assessment   Prior breastfeeding experience (comment below) Multip   Prior BF experience: comment Hx  demise at 15 days of life in [place] (while she was nursing, per dad)   Pain assessment   Location/Comment denies   Guidelines for use of:   Breast pump type Ameda Platinum;Hand Pump   Suggested use of pump Pump 8-12X/24hr   Reported pumping volumes (ml) 40-50ml   Other (comment) Mom with history of  demise at 15 days of life in [place] (while she was nursing, per dad), delivered a 34 3/7 week gestation girl who is in [facility], has been using the Ameda pump with 40-50ml volume,  Mom to go home today, offered hospital grade pump rental but mom has denied and wants to use a handpump, she said she had fevers and symptoms of mastitis with her infant loss before, I highly encouraged her to rent the pump or purchase a pump, and also went over symptoms of engorgement and mastitis and told her she would need to call her doctor immediately.  Discussed the [place] breastfeeding center and encouraged to call if needed.

## 2024-08-07 VITALS
DIASTOLIC BLOOD PRESSURE: 93 MMHG | OXYGEN SATURATION: 98 % | RESPIRATION RATE: 20 BRPM | HEART RATE: 86 BPM | WEIGHT: 183 LBS | TEMPERATURE: 98 F | BODY MASS INDEX: 31 KG/M2 | SYSTOLIC BLOOD PRESSURE: 132 MMHG

## 2024-08-07 NOTE — PROGRESS NOTES
[facility]  Obstetrics and Gynecology    OB/GYN: Postpartum Progress Note     SUBJECTIVE:  Patient is a 34 year old  female who is s/p PLTCS. She is POD# 5  Doing well. Noted pain is well controlled. Denies fever, chills, N, V, chest pain and SOB. Bleeding has been stable.  Voiding without difficulty.  Passing flatus.  + Bm yesterday. Tolerating general diet. Ambulating without difficulty.     OBJECTIVE:  Vitals:    24 2000 24 0003 24 0421 24 0841   BP: 128/73 132/88 (!) 130/91    Pulse: 79 83 76    Resp: 16   20   Temp: 98 °F (36.7 °C)   97.7 °F (36.5 °C)   TempSrc: Oral   Oral   SpO2:    98%   Weight:           Physical Exam:  Lungs:   Respirations non labored   Cardiovascular:   Peripheral pulses +2 bilaterally      General:    AAA. NAD.    Abdomen Soft, nontender, nondistended   Lochia:  appropriate   Uterine Fundus:   firm below the umbilicus   Incision:  healing well, no significant drainage, no dehiscence, no significant erythema with tegaderm in place    DVT Evaluation:  No evidence of DVT seen on physical exam.  Negative Haja's sign.  No cords or calf tenderness.  No significant calf/ankle edema.     Recent Labs   Lab 24  1129 24  0933 24  0531 24  1616 24  0856   RBC 4.70 4.57 2.85*  --  3.63*   HGB 11.3* 11.0* 6.9* 8.2* 8.8*   HCT 36.5 33.6* 22.4* 25.3* 28.2*   MCV 77.7* 73.5* 78.6*  --  77.7*   MCH 24.0* 24.1* 24.2*  --  24.2*   MCHC 31.0 32.7 30.8*  --  31.2   RDW 15.4* 15.4* 15.4*  --  16.4*   NEPRELIM 4.91 12.59* 13.59*  --   --    WBC 8.9 16.9* 19.0*  --  11.6*   .0 235.0 197.0  --  261.0           ASSESSMENT/PLAN:  Patient is a 34 year old  female who is s/p PLTCS POD# 5.     Doing well      ABLA  - s/p transfusion 1 unit RBC. Getting venofer    Severe range BP postpartum  - now on Nifedipine 30 mg daily, controlled    Social  - SW has helped her coordinate resources.    Plan discharge today.       MD ANAT Rivera

## 2024-08-07 NOTE — PROGRESS NOTES
Patient and family ready for discharge per MD orders. D/c instructions reviewed with family, verbalize understanding. All questions answered. Encouraged to call MD with any questions or concerns. Aware of need to go to follow up appt and verbalize understanding of all medications. Patient and family left at this time with all belongings. Escorted in stable condition with partner to [facility].

## 2024-08-07 NOTE — DISCHARGE SUMMARY
[facility]  part of [facility]    Discharge Summary    Xu Martinez Patient Status:  Inpatient    1990 MRN [id number]   Location [facility] 3SE Attending Carlos Renae MD   Hosp Day # 6 PCP No primary care provider on file.     Date of Admission: 2024    Date of Discharge: 24    Admission Diagnoses: high bp  Pregnancy (HCC) at 34w4d     Secondary Diagnosis: fetal intolerance to labor, AMA, history preeclampsia, history  demise.     Primary OB Clinician: [address] Course:     EDC: Estimated Date of Delivery: 24    Gestational Age: 34w4d    Date of Delivery: 24    Antepartum complications:  preeclampsia     Delivered By: Dr. Angie Morales    Delivery Type:  Primary  section    Tubal Ligation: n/a    Baby: Liveborn female,     Apgars:  1 minute:   8                 5 minutes: 9                        10 minutes:      Anesthesia: general    Consultants         Provider   Role Specialty     Gail Elam MD      Consulting Physician Maternal Fetal Medicine          Surgical Procedures       Case IDs Date Procedure Surgeon Location Status    [id number] 24  SECTION Angie Pompa MD [facility] L+D OR Comp            Intrapartum Complications: Non-reassuring Fetal Status    Laceration: n/a    Episiotomy: none    Placenta: spontaneous    Feeding Method: breast fed    Rh Immune Globulin Given: no    Rubella Vaccine Given: no    Patient is a 34-year-old, G3, P2, at 34 weeks, who was admitted for elevated blood pressures. However, she had received betamethasone yesterday for 1 dose. Her blood pressure is normal. However, the patient started having contractions and was noted to have persistent deep variable decelerations. Attempts were made to place an IUPC and amnioinfuse her uterus. However, the variable decelerations continued and patient was only 5 cm and was taken for urgent  for fetal intolerance to labor.   section was performed under general anesthesia without complication. She had postop anemia with hgb 6.9 and was provided 1 unit PRBCs. She was given IV Venofer as well. She had postpartum HTN requiring 1 IV push labetalol and procardia XL 30 mg was started. She was medically stable for discharge on POD#5.    Discharge Plan:   Discharge Condition: Good  Early Discharge:  NO    Discharge medications:  Current Discharge Medication List        New Orders    Details   gabapentin 300 MG Oral Cap Take 1 capsule (300 mg total) by mouth 3 (three) times daily as needed (moderate surgical pain).      ibuprofen 600 MG Oral Tab Take 1 tablet (600 mg total) by mouth every 6 (six) hours.      NIFEdipine ER 30 MG Oral Tablet 24 Hr Take 1 tablet (30 mg total) by mouth daily.           Home Meds - Unchanged    Details   Ferrous Sulfate 325 (65 Fe) MG Oral Tab Take 1 tablet (325 mg total) by mouth every other day.      prenatal vitamin with DHA 27-0.8-228 MG Oral Cap Take 1 capsule by mouth daily.                   Discharge Diet: As tolerated    Discharge Activity: Pelvic rest until cleared, no heavy lifting more than 20# for 8 weeks.     Follow up:      Follow-up Information       [facility]. Call.    Specialty: Pediatrics  Why: As needed  Contact information:  [address]  [facility] [address] [phone]  Additional information:  Masks are optional for all patients and visitors, unless otherwise indicated.             Lindy Berg CNM. Go on 2024.    Specialties: Midwife, OBSTETRICS & GYNECOLOGY  Why: at 9am   for your 6 week post partum appointment  Contact information:  [address]  [facility] [address] [phone]               Angie Pompa MD Follow up in 2 week(s).    Specialty: OBSTETRICS & GYNECOLOGY  Why: incision check  Contact information:  [address] [facility]  [address] [place] [address] [phone]                             Follow  up Labs: None        Other Discharge Instructions:         What to Expect:  Abdominal cramping after delivery especially if you are breastfeeding.   Vaginal bleeding for about 4-6 weeks that may be followed by a yellow or white discharge for a few more weeks.  Your period will resume in approximately 6-8 weeks, unless you are breastfeeding.    If you are breastfeeding, nipple dryness is very common the first few days.    Constipation is common after having a baby.  Please increase fluid and fiber in your diet.       Over-The-Counter Medication  Non-prescription anti-inflammatory medications can also help to ease the pain.  You may take Tylenol or Ibuprofen   Colace or Metamucil for Constipation  Lanolin for dry nipples    Bathing/Showers  You may resume showers  No baths, swimming, hot tubs until your post-partum visit     Home Medication  Resume your home medications as instructed     Diet  Resume your normal diet     Activity  Refrain from vaginal intercourse, vaginal suppositories, tampon use or douches until after your post-partum visit.  No exercising for 4 weeks  Do not do heavy housework for at least 2-3 weeks        Questions or Concerns  Call your obstetrician’s office if you experience the following:  Severe pain not controlled by pain medication  Foul smelling vaginal discharge  Heavy bleeding  Shortness of breath  Fever  Redness, increased swelling or drainage from your incision  Crying and periods of sadness that prevents you from caring for yourself and your baby  Burning sensation during urination or inability to urinate  Swelling, redness or abnormal warmth to your leg/calf              [facility] has great support for our families even after discharge.  We have virtual or in-person support groups.  Visit our website for the most up-to-date info for our many different support groups. https://www.health.org/services/pregnancy-baby/resources/       Outpatient Lactation appoints.  Call  [phone]- to schedule an appt.  Our office is located in the Maternal Fetal Medicine office next to [facility] on the first floor.      Moms Support Groups  Our weekly New Mom Support Groups are for any new parents in our community. They are led by an experienced Mother/Baby nurse or IBCLC and usually include a guest speaker on a topic of interest to new parents. These in-person groups also include Breastfeeding Support at each meeting. Bring your baby ( - 6 months) with you! Moms-to-be are also welcome! All mom's welcome even if its not your first.     MOM & BABY HOUR   Meets most  10:00 - 11:30 a.m.  Masks are not required, but be considerate of others and do not attend if mom or baby have had any symptoms of illness within the previous 24 hours. Breastfeeding support will be included at each session--just ask the leader any breastfeeding questions you may have. Location [facility] [place] [address] [place] Go inside the front door and to the right to the “Community Education Room”.    Mom's Line: [phone]  A phone line dedicated for women (or anyone worried about a women) who may be experiencing signs or symptoms of postpartum depression, anxiety, or overwhelmed with new baby. Call - answered by live trained mental health professionals, free and confidential, emotional support, referrals, in any language.    Nurturing Mom- A support group for new and expectant moms looking for support with the transition to parenthood as well as those experiencing symptoms of  anxiety and/or depression.  Please contact [email or web address] if you need directions or the link for the virtual meetings. Please contact [email or web address] if you plan to attend, but please be considerate of others and do not attend if mom or baby have had any symptoms of illness within the previous 24 hours.     La Leche League for breast feeding and parent support, Website:  IIIus.org  and for the [place] group and other groups visit https://www.Blendagram.com/pg/Ender/events/.  to help find a group, all meetings are virtual.     Facebook groups-  for more support when home- [facility]ies & Mommies [facility] --- you can find mom-to-mom advice and the list of speaker topics for cradle talk program.  Telephone Support  Postpartum depression [place] of [place] (www.ppdil.org)  -Free information and support for pregnant and postpartum women with symptoms of depression, anxiety  -Mom-to-mom support from volunteers who have been through depression    Telephone support: ([phone] [phone]  Urgent [phone] (answered 24/7)  Email support: support@ppdil.org    Postpartum Support International (www.postpartum.net)  -Free phone conversation with trained volunteers   (603) 524-7573; after the prompt enter 48402#    National Postpartum Depression Hotline  (888)-PPD-MOMS    Helpful websites:    www.GoMoreli.LogicLoop  www.[employer]  www.Breastfeed[place].org    Initiation of breast pumping after discharge:     - Store the breast milk in 2-3 oz containers.     - Label containers with date and time.    - Always feed oldest milk first.             Additional Resources:  To extend a nonimmigrant or tourist visa, the mother can apply while still in the [place]. It's best to file at least 45 days before the visa expires. The fee schedule for Form I-539 can be used to determine the cost. If the mother has proof of filing the I-539 application, she can stay in the [place] for up to 240 days after the expiration date on her I-94.     I-539, Application to Extend/Change Nonimmigrant Status    The following groups use this form:    Certain nonimmigrants extending their stay or changing to another nonimmigrant status;  [place] ([facility]) residents applying for an initial tiffany of status;  F and M nonimmigrants applying for reinstatement; and,  Persons seeking V  nonimmigrant status or an extension of stay as a V nonimmigrant.  You must carefully review the Form I-539 filing instructions (PDF, 437.34 KB) before submitting your request to ensure you are filing your request for an extension or change of status using the proper form. (Form can be found online)    Forms and additional information can be found https://myaccount.uss.gov/sign-in      Post  Section Home Care Instructions     We hope you were pleased with your care at [facility].  We wish you the best outcome and overall experience with the delivery of your baby.  These instructions will help to minimize pain, limit the risk for an infection, and improve the likelihood of a successful recovery.    What to Expect:  Abdominal cramping after delivery especially if you are breastfeeding.   Vaginal bleeding for about 4-6 weeks that may be followed by a yellow or white discharge for a few more weeks.  Your period will resume in approximately 6-8 weeks, unless you are breastfeeding.    If you are bottle feeding, you may notice breast engorgement in about 3 days.  Your breast may be sore and hard. Please wear a tight fitted bra or sports bra for 24-36 hours to help prevent your breast from producing milk, and use ice packs to relive any discomfort.  If you are breastfeeding, nipple dryness is very common the first few days.    Constipation is common after having a baby.  Please increase fluid and fiber in your diet.      Over-The-Counter Medication  Non-prescription anti-inflammatory medications can also help to ease the pain.  You may take Aleve, Tylenol or Ibuprofen   Colace or Metamucil for Constipation  Lanolin for dry nipples  Tucks, Witch Hazel and Epifoam for vaginal/perineum discomfort.   Drink a full glass of water with oral medication and take as directed.    Wound Care  The following instructions will promote proper healing and help to prevent infection  Please use soap and water over  incision   Pat your incision dry and leave open to air if possible   If you have steri - strips, then please remove after 4-5 days from your surgery. You may remove after a shower to decrease discomfort.   Do not replace the Steri-Strips, if they come off.  If the tapes come off, leave them off and keep the incision clean.  You do not need to cover the incision or put any medications on the incision.    Bathing/Showers  You may resume showers  No baths, swimming, hot tubs until your post-partum visit    Home Medication  Resume your home medications as instructed    Diet  Resume your normal diet    Activity  Refrain from vaginal intercourse, vaginal suppositories, tampon use or douches until after your post-partum visit  No exercising for 4 weeks  You may climb stairs minimally for the 1st week.    Do not do heavy housework for at least 2-3 weeks    Return to Work or School  You may return to work in 6-8 weeks  Contact your obstetrician’s office, if you need a medical release. ([phone])    Driving  Avoid driving for 1-2 weeks or sooner if not taking narcotics.    Follow-up Appointment with Your Obstetrician  Call your obstetrician’s office today for an appointment in four weeks.    The number is [phone].  Verify your appointment date, day, time, and location.  At your 1st post-partum office visit:  Your progress will be evaluated, findings reviewed, and any additional concerns and instructions will be discussed.    Questions or Concerns  Call your obstetrician’s office if you experience the following:  Severe pain not controlled by pain medication  Foul smelling vaginal discharge  Heavy bleeding  Shortness of breath  Fever  Redness, increased swelling or drainage from your incision  Crying and periods of sadness that prevents you from caring for yourself and your baby  Burning sensation during urination or inability to urinate  Swelling, redness or abnormal warmth to your leg/calf  Please call [phone].  If your call is made after office hours, a physician will be available to help you.  There is always a provider covering our patients.    Thank you for coming to [facility] to start your new family.  The nurses, obstetricians, and the anesthesiologists try very hard to make sure you receive the best care possible.  Your trust in them as well as us is greatly appreciated.    Thanks so much,   [facility]          Deisi Kirk MD    [facility]

## 2024-08-07 NOTE — PROGRESS NOTES
Elevated BP in office. + protein in urine. Advised to go to hospital for evaluation concern for preeclampsia. Patient and family decline - have festivities for her brothers wedding today. Agrees to take BP at home. Discussed parameters for normal <140/90. Reviewed risks of preeclampsia. Denies headache vision changes and RUQ pain. Active baby. Will send for urine culture.

## 2024-08-07 NOTE — PLAN OF CARE
Problem: POSTPARTUM  Goal: Long Term Goal:Experiences normal postpartum course  Description: INTERVENTIONS:  - Assess and monitor vital signs and lab values.  - Assess fundus and lochia.  - Provide ice/sitz baths for perineum discomfort.  - Monitor healing of incision/episiotomy/laceration, and assess for signs and symptoms of infection and hematoma.  - Assess bladder function and monitor for bladder distention.  - Provide/instruct/assist with pericare as needed.  - Provide VTE prophylaxis as needed.  - Monitor bowel function.  - Encourage ambulation and provide assistance as needed.  - Assess and monitor emotional status and provide social service/psych resources as needed.  - Utilize standard precautions and use personal protective equipment as indicated. Ensure aseptic care of all intravenous lines and invasive tubes/drains.  - Obtain immunization and exposure to communicable diseases history.  Outcome: Adequate for Discharge  Goal: Establishment of adequate milk supply with medication/procedure interruptions  Description: INTERVENTIONS:  - Review techniques for milk expression (breast pumping).   - Provide pumping equipment/supplies, instructions, and assistance until it is safe to breastfeed infant.  Outcome: Adequate for Discharge  Goal: Appropriate maternal -  bonding  Description: INTERVENTIONS:  - Assess caregiver- interactions.  - Assess caregiver's emotional status and coping mechanisms.  - Encourage caregiver to participate in  daily care.  - Assess support systems available to mother/family.  - Provide /case management support as needed.  Outcome: Adequate for Discharge

## 2024-08-22 ENCOUNTER — TELEPHONE (OUTPATIENT)
Dept: OBGYN UNIT | Facility: HOSPITAL | Age: 34
End: 2024-08-22

## 2024-08-23 ENCOUNTER — POSTPARTUM (OUTPATIENT)
Dept: OBGYN CLINIC | Facility: CLINIC | Age: 34
End: 2024-08-23
Payer: MEDICAID

## 2024-08-23 VITALS — BODY MASS INDEX: 29 KG/M2 | SYSTOLIC BLOOD PRESSURE: 118 MMHG | DIASTOLIC BLOOD PRESSURE: 74 MMHG | WEIGHT: 169.63 LBS

## 2024-08-23 NOTE — PROGRESS NOTES
CC: Patient is here for FU after emergency c/s. Accompanied by her     HPI: Patient is a 34 year old  for 2 W FU after 34 W emergency c/s for fetal distress. Baby remained in the hospital for 10 days, and is now home. The patient had elevated bp's postpartum and was started on procardia, but she has not taken it for the past 2 days.     She is not depressed, but stressed about needing to return to [place]. Her 2nd baby  15 days after delivery and she is very scared about taking this baby back to [place] as a premature baby.     She otherwise denies depression and declines any meds or therapy.     Patient's last menstrual period was 2023 (exact date).    OB History    Para Term  AB Living   3 3 2 1   2   SAB IAB Ectopic Multiple Live Births         0 3      # Outcome Date GA Lbr Gideon/2nd Weight Sex Type Anes PTL Lv   3  24 34w4d  3 lb 14.4 oz (1.77 kg) F Caesarean Gen Y MARCUS      Complications: Variable decelerations, Late decelerations, Fetal intolerance to labor, Amnioinfusion, Oligohydramnios, Other - see comments   2 Term 23    M NORMAL SPONT  N ND      Birth Comments: [facility]   1 Term 07/10/16   6 lb (2.722 kg) M NORMAL SPONT  N MARCUS       GYN hx:       LPS:    No past medical history on file.  Past Surgical History:   Procedure Laterality Date      2024    LTCS for fetal distress     No Known Allergies  No family history on file.  Social History     Socioeconomic History    Marital status:      Spouse name: Not on file    Number of children: Not on file    Years of education: Not on file    Highest education level: Not on file   Occupational History    Not on file   Tobacco Use    Smoking status: Never    Smokeless tobacco: Never   Vaping Use    Vaping status: Never Used   Substance and Sexual Activity    Alcohol use: Not on file    Drug use: Not on file    Sexual activity: Not on file   Other Topics Concern    Not on file   Social  History Narrative    Not on file     Social Determinants of Health     Financial Resource Strain: High Risk (2024)    Financial Resource Strain     Difficulty of Paying Living Expenses: Very hard     Med Affordability: Yes   Food Insecurity: Food Insecurity Present (2024)    Food Insecurity     Food Insecurity: Sometimes true   Transportation Needs: Unmet Transportation Needs (2024)    Transportation Needs     Lack of Transportation: Yes     Car Seat: No   Stress: No Stress Concern Present (2024)    Stress     Feeling of Stress : No   Housing Stability: Medium Risk (2024)    Housing Stability     Housing Instability: No     Housing Instability Emergency: Not on file     Crib or Bassinette: No       Medications reviewed. See active list.     /74   Wt 169 lb 9.6 oz (76.9 kg)   LMP 2023 (Exact Date)   Breastfeeding Yes   BMI 29.11 kg/m²       Exam:   GENERAL: well developed, well nourished, in no apparent distress  ABDOMEN: wound cdi        A/P: Patient is 34 year old female     1. Postpartum hypertension (HCC)    2. Postpartum care following  delivery (HCC)    HTN resolved. OK to stop procardia  Recommend they remain in [place] for 6 M due to prematurity.  DW them that if this needs to be extended that they should contact the pediatrician.     Angie Pompa MD

## 2024-09-13 ENCOUNTER — POSTPARTUM (OUTPATIENT)
Dept: OBGYN CLINIC | Facility: CLINIC | Age: 34
End: 2024-09-13

## 2024-09-13 VITALS
BODY MASS INDEX: 32.47 KG/M2 | HEART RATE: 82 BPM | SYSTOLIC BLOOD PRESSURE: 108 MMHG | DIASTOLIC BLOOD PRESSURE: 76 MMHG | HEIGHT: 61 IN | WEIGHT: 172 LBS

## 2024-09-13 DIAGNOSIS — F43.29 STRESS AND ADJUSTMENT REACTION: ICD-10-CM

## 2024-09-13 DIAGNOSIS — Z12.4 PAP SMEAR FOR CERVICAL CANCER SCREENING: ICD-10-CM

## 2024-09-13 PROBLEM — Z87.59 HISTORY OF PRE-ECLAMPSIA: Status: ACTIVE | Noted: 2024-08-01

## 2024-09-13 NOTE — PROGRESS NOTES
Patient seen in conjunction with [facility]. I personally witnessed the patient's exam and medical decision making on this date of service.  I was physically present in the room for the performance of the E/M service.  I have reviewed the CNM student's documentation and findings including history, Exam, and Medical Decision Making, edited the document for accuracy and verify that it represents the clinical findings and services performed.

## 2024-09-13 NOTE — PATIENT INSTRUCTIONS
After Giving Birth: How to Feel Healthy    Helping yourself feel fit is one of the best things you can do for your baby. A little exercise will tone your muscles. You’ll feel stronger and more energized. You’ll also feel more awake and aware. Don’t worry about your weight right now. Your goal is to feel healthy. Part of feeling good is dressing for comfort. If you dress “smart,” you can be a busy new mom and still look great.  Continue Kegel exercises  You may have been told to do Kegel exercises during pregnancy. These exercises strengthen the muscles that are strained by carrying and delivering the baby. You can return to your Kegels as soon as you feel ready. Why not start today? Squeeze your pelvic floor muscles (the ones that control your urine stream) for at least 5 seconds. Relax, then squeeze again. Work your way up to 50 or 100 Kegels a day.  Exercise often  Exercise helps you get in shape. It also strengthens your muscles, so you are better fit for lifting the baby. As an added benefit, exercise gives you a sense that you’re doing something good for yourself. Take your baby for a short walk, or spend 10 minutes stretching. If you were active during pregnancy, you can probably begin light exercise as soon as you feel ready. But be sure to check with your healthcare provider before you begin.  Stay off the scale  For the first month, think about regaining energy and feeling good, not about losing weight. Losing weight too soon can make you feel more tired. Instead, focus on caring for your baby and eating balanced meals. You may lose some weight without even trying, especially if you’re breastfeeding. Once your energy level is back to normal, you can begin to lose weight. A gradual weight loss of 4 or 5 pounds (1.8 or 2.27 kg) a month is safest.  Dress smart  You’ll want to be comfortable during the first days after delivery. Wear a robe, pajamas, or sweats -- whatever feels best. Soon you may want to look  more like your prepregnant self. Do your hair and wear makeup, if you normally do. A loose-fitting dress may feel good. But do yourself a favor: Don’t reach for your jeans. It’s likely to be a month or more before you can wear them. If leaking breasts are a problem, put pads inside your bra and dress in layers. If you’re breastfeeding, shirts that open in front or pullover tops are good choices. A scarf or shawl can be used as a drape if you breastfeed when others are present.  When to call your healthcare provider  Remember to schedule your postpartum visit. If you delivered by , be seen within 2 weeks. For vaginal delivery, be seen 4 to 6 weeks after the birth. Also, call your healthcare provider if you have:  Heavy bleeding  Fever  Redness or persistent lump in breasts  Inability to void or have a bowel movement after 1 week  Severe pain  Worsening depression  [employer] last reviewed this educational content on 2018 The [employer]. [address] [place] [address]. All rights reserved. This information is not intended as a substitute for professional medical care. Always follow your healthcare professional's instructions.        Nutrition While Breastfeeding  Do I need a special diet for breastfeeding?    You don't have to eat a special diet to make enough milk for your baby. Also, your milk will be of good quality for your baby regardless of what you eat. But your body needs fuel to make breastmilk. So eat your fill of a variety of foods. Breastfeeding isn’t an excuse to eat and drink everything you want. But it’s not a reason to avoid favorite foods either.   Healthy diet for the new mother  A healthy diet is recommended for all women and offers many benefits to the new mother. Choosing a variety of healthy foods creates a pattern for the entire family. Each family member benefits. Women who are breastfeeding need about 500 extra calories per day. Some women might  need more, while others might need less. When choosing foods, use the nutrition chart below as a guide.  Bread, cereal, rice, and pasta Vegetables Fruit   Milk, yogurt, and cheese Meat, poultry, fish,  dry beans, eggs, and nuts Fats, oils, and sweets  (use sparingly)   What’s good for you?  Here are some things to do:  Breastfeeding women need to drink when they feel thirsty. There is no specific amount of water you need to drink to make enough milk.  Follow healthy eating guidelines.  Snack on fruit or low-fat dairy products if you’re hungry between meals.  If your healthcare provider recommends it, keep taking prenatal vitamins.  What’s not good for you?  Here are other things to consider:  Limit fatty foods and foods that are high in sugar (cookies, cakes).  Be aware that what enters your body may pass into your breastmilk. Limit caffeine. It is not just in coffee. It is also in cola, tea, and chocolate.  Limit the amount of fish that may contain mercury, such as shark and swordfish.  Talk with your healthcare provider before taking any medicines. It is important to let your healthcare provider know that you are nursing. Some medicines are not safe with breastfeeding.  Remember: Alcohol, cigarettes, and drugs also affect your breastmilk and your baby. Talk with your healthcare provider.  [employer] last reviewed this educational content on 1/1/2018  © 6034-6470 [employer]. [address] [place] [address], [place] [address]. All rights reserved. This information is not intended as a substitute for professional medical care. Always follow your healthcare professional's instructions.        Breastfeeding: Caring for Yourself  When you have a new little person in your life, it’s easy to forget about yourself. There are new demands on your time. There are also new responsibilities. But it’s important to take care of yourself. This will help you take better care of your new baby.    Healthy habits  Here are some  healthy tips:  Get exercise when you can. If you leak milk, it will help to nurse right before the activity.  Avoid smoking. Smoking is unhealthy for you and may cause you to make less milk. Secondhand smoke is also harmful to your baby.  Talk to your healthcare provider about alcohol, if you choose to drink.  When you’re sick, tell your healthcare provider that you are breastfeeding. Few medicines and illnesses affect breastfeeding, but it is important to check.  Ask your healthcare provider before taking any prescription or over-the-counter medicines, herbs, or supplements.  Comfy clothes  Suggestions for being comfortable when breastfeeding include:  Find a comfortable nursing bra. Many women find underwire uncomfortable. Some stores offer on-site fittings. Ask your healthcare provider or nurse for a referral.  If you have leaking milk, place breast pads inside your bra.  Choose an extra-supportive bra for exercise. Or you can wear two bras at the same time for more support.  Wear loose tops that can be lifted for breastfeeding. You can also buy clothes specially made for breastfeeding moms.  A note about sex  After delivery, it may take a while before your interest in sex returns. Share your feelings with your partner. Your healthcare provider will let you know when it is safe to resume having sex. When you’re ready, know that:  There are several forms of birth control that can be used while breastfeeding. Ask your healthcare provider what to use for pregnancy prevention while you are nursing.  Breastfeeding hormones may cause vaginal dryness. Some women find using a water-based lubricant makes sex more comfortable.  Milk may leak out when you are aroused. Applying pressure on the nipple, using breast pads, or a towel may help with this.  When to call your healthcare provider  Call your healthcare provider if:  You feel overwhelmed and don't know where to turn.  You feel very sad or don’t want to be with your  baby.  You feel like your baby cries all the time and won't be soothed.  You are unable to exercise, or have sex, without discomfort.  You are unsure about a medicine, illness, or activity and its effect on breastfeeding.  [employer] last reviewed this educational content on 2018 The [employer]. [address] [place] [address], [place] [address]. All rights reserved. This information is not intended as a substitute for professional medical care. Always follow your healthcare professional's instructions.        For New Mothers: Staying Fit After Delivery    After you deliver your baby, you can start to exercise when you feel ready. Let your body be your guide. Most women are ready to exercise after 6 weeks, where some women will be ready a few days after giving birth. If you’ve had a  section, you will need more time. Ask your healthcare provider when it is safe to start exercising again.  Exercise tips for new mothers  You can start doing Kegel exercises as soon as you deliver your baby. Do them at least 10 times a day to help avoid bladder problems later on. Kegel exercises help strengthen your pelvic muscles. To do them, squeeze the muscles that you use to stop passing urine (do not do this while urinating). Hold that squeeze for a count of 10, then release.   You will want to resume other exercise gradually and talk to your healthcare provider before starting. Always exercise with care. When you first start exercising after giving birth, try simple exercises that help strengthen major muscle groups, including abdominal and back muscles. Slowly add moderate-intensity exercise. Try to work up to at least 150 minutes of moderate-intensity aerobic activity every week. Moderate intensity means you are moving enough to raise your heart rate and start sweating. You can still talk normally. But you cannot sing. Muscle-strengthening exercises should be done along with your aerobic activity on at  least 2 days a week. Look for ways to combine exercising with being with your new baby. Try putting your baby into a front pack or in the stroller and take a walk.  Strengthening stomach muscles  Many new mothers want to strengthen their stomach muscles after giving birth. Try this exercise when you’re ready to resume your program. It will strengthen the front and side muscles of your stomach:  Lie on your back with your knees bent and feet flat on the floor. Cross your arms over your stomach. Use your fingers to gently pull the sides of the stomach toward the middle of your body.  Exhale and try to pull the stomach muscles toward your spine. Gently raise your shoulders off the floor, no more than 6 to 8 inches. Hold for 5 seconds. Repeat 5 times.  [employer] last reviewed this educational content on 2/1/2018 © 2000-2020 The [employer]. [address] [place] [address]. All rights reserved. This information is not intended as a substitute for professional medical care. Always follow your healthcare professional's instructions.        Understanding Postpartum Depression  You’ve just had a baby. You expected to be excited and happy. But instead you find yourself crying for no reason. You may have trouble coping with your daily tasks. You feel sad, tired, and hopeless most of the time. You may even feel ashamed or guilty. But what you’re going through is not your fault and you can feel better. Talk with your healthcare provider. He or she can help.     What is depression?  Depression is a mood disorder that affects the way you think and feel. The most common symptom is a feeling of deep sadness. You may also feel as if you just can’t cope with life. Other symptoms include:   Gaining or losing a lot of weight  Sleeping too much or too little  Feeling tired all the time  Feeling restless  Crying a lot  Having too little or too much appetite.  Withdrawing from friends and family  Having headaches, aches  and pains, or stomach problems that won't go away  Feeling anger or rage  Fears of harming your baby  Lack of interest in your baby  Feeling worthless or guilty  No longer finding pleasure in things you used to  Having trouble thinking clearly or making decisions  Thinking about death or suicide  Depression after childbirth  You may be weepy and tired right after giving birth. These feelings are normal. They’re sometimes called the “baby blues.” These blues go away after 1 to 2 weeks. However, postpartum (meaning “after birth”) depression lasts much longer and is more severe than the baby blues. It can make you feel sad and hopeless. You may also fear that your baby will be harmed and worry about being a bad mother.   What causes postpartum depression?  The exact cause of postpartum depression is unknown. Changes in brain chemistry or structure are believed to play a big role in depression. It may be due to changes in your hormones during and after childbirth. You may also be tired from caring for your baby and adjusting to being a mother. All these factors may make you feel depressed. In some cases, your genes may also play a role.   Depression can be treated  There are many ways to treat postpartum depression. Talking to your healthcare provider is the first step toward feeling better.   When to call your healthcare provider   Call your healthcare provider if you:    Cry for no clear reason  Have trouble sleeping, eating, and making choices  Question if you can handle caring for a baby  Have intense feelings of sadness, anxiety, or despair that prevent you from being able to do your daily tasks  To learn more  National [place] of Mental Health, 498.585.2423, www.nimh.nih.gov  National [place] on Mental Illness, 597.165.8991, www.flip.org  M[facility], 595.503.1175, www.nmha.org  National Suicide Hotline, 239-SUICIDE (682-025-5724)  National Suicide Prevention Lifeline, 777.230.3815,  www.suicidepreventionlifeline.org    [employer] last reviewed this educational content on 5/1/2020 © 2000-2020 [employer]. [address] [place] [address], [place] [address]. All rights reserved. This information is not intended as a substitute for professional medical care. Always follow your healthcare professional's instructions.        Expressing Your Milk  Work, school, or even a late-night movie can require you to be away from your baby. This doesn't mean you have to give up breastfeeding. Feeding your baby from your breasts is ideal. If you must be away from your baby, you can express milk from your breast. Talk with your healthcare provider about the best ways to feed expressed breastmilk to your infant. But remember, don’t give your baby bottles or pacifiers until he or she is about 4 to 6 weeks old, unless required sooner for health reasons. This helps you both get a good start on breastfeeding. Your baby can get used to your natural nipple first.      Expressing by hand       Expressing with double pump      Always wash your hands before expressing milk from your breast.  Stimulating letdown  Hold a washcloth under very warm water and wring it out.  Place one warm washcloth over each breast to warm them.   Gently massage your breasts to stimulate the milk flow.  Start under the arm and move around the entire breast.   Use the backs of your fingernails to gently scratch the skin of your breasts downward from the outside toward your nipples.   If you’re away from your baby, looking at your baby’s picture can help your milk let down.    Expressing by hand or pump  Your lactation consultant can help you choose the best method for your needs. Here are some tips:   Expressing by hand reduces pressure in swollen or leaky breasts. It may be a good way to start a pumping session. If you need to give expressed milk to your baby in the first few days after delivery, hand expression can often help get more  colostrum than using a pump. Ask your nurse or midwife to teach you how to hand express.  Start expressing within 6 hours of separation from your baby in the hospital.  When  from your baby, it's best to express as often and as long as a baby would breastfeed. Newborns feed 8 to 12 times each 24 hours.  A pump gently pulls your nipple into the cup like a baby’s suck and can be the fastest way to express after your milk comes in. Pumps come in manual, battery-operated, and electric styles. To protect your breasts and the milk you pump, follow the instructions that come with your pump.  For sick or premature babies who aren't feeding at the breast, \"hands-on pumping\" is a special way to help be sure you make enough milk. Hands-on pumping involves a combination of both hand expression and an electrical pump.   Hand expressing while using a pump can increase the amount of milk you can pump. It can also increase the fat content of the pumped milk.  You can usually buy or rent a pump from a drugstore or medical equipment store. Check with your hospital to find out where you can buy or rent a pump.  Working and breastfeeding  Breastfeed your baby all of the time during your maternity leave. This helps set up your milk supply for the whole year.  When your baby is about 2 weeks old, start pumping after you feed the baby. You can freeze this expressed milk. It will help you build a supply for going back to work. Nursing plus pumping will help your breasts make more milk. Feeding your baby from your breasts is ideal. If you must be away from your baby, you can express milk from your breast. Talk with your healthcare provider about the best ways to feed expressed breastmilk to your infant.    Express milk during work breaks. This helps protect your milk supply. It also helps prevent engorged or leaking breasts.  Arrange to breastfeed at lunch if your childcare is nearby. If not, be sure to pump during your lunch  break.  Breastfeed before you leave for work and soon after you return home. Your partner may be able to make dinner while you feed the baby.  Breastfeed at night and on weekends. This will keep up your milk supply. Talk to your healthcare provider about the best ways to feed expressed breastmilk to your infant.    [employer] last reviewed this educational content on 6/1/2020 © 2000-2020 The [employer]. [address] [place] [address], [place] [address]. All rights reserved. This information is not intended as a substitute for professional medical care. Always follow your healthcare professional's instructions.        Storing Expressed Milk    You can express your milk and store it in clean containers. Your family or a sitter can feed it to the baby. This way, your baby gets the benefits of your milk even when you can't be there at feeding time.  Type of storage Storage times   Room temperature     At room temperature (up to 78°F or 26°C)  Tip: Keep the container clean, covered, and cool. 3 to 4 hours is best; 6 to 8 hours is acceptable under very clean conditions   Refrigerator     In a refrigerator (less than 39°F or less than 4°C)  Tip: Place milk in the back of the main section of the refrigerator. 72 hours is best; up to 8 days is acceptable under very clean conditions   Freezer      In a freezer (0°F or -17°C)  Tip: Store milk toward the back of the freezer. 6 months is best; 12 months is acceptable   Guidelines for milk storage  Always use a clean container to collect and store milk. Never pour warm expressed milk into a bottle with cold milk. And be sure to label and date each bottle or bag of milk. To store milk safely, see the chart above.  Warming stored milk  Thaw frozen milk in the refrigerator or in a bowl of warm water. It’s a good idea to warm refrigerated milk before using it. For your baby’s safety:  Use the oldest milk first.  Warm a container of milk by putting it in a bowl of warm (not hot)  water for a few minutes. Or use a bottle warmer set on low.  Gently swirl the milk to mix it. Then place a few drops on your wrist. The milk should be near room temperature.  Don’t put the milk in a microwave. This could create pockets of hot liquid that can burn your baby’s mouth.  [employer] last reviewed this educational content on 7/25/2018 © 2000-2020 The [employer]. [address] [place] [address], [place] [address]. All rights reserved. This information is not intended as a substitute for professional medical care. Always follow your healthcare professional's instructions.        Kegel Exercises  Kegel exercises are done to help strengthen the muscles in your pelvic floor. You don’t need special clothing or equipment. They’re easy to learn and simple to do. And if you do them right, no one can tell you’re doing them, so they can be done almost anywhere. Your healthcare provider, nurse, or physical therapist can answer any questions you have and help you get started.     A weak pelvic floor  The pelvic floor muscles may weaken due to aging, pregnancy and vaginal childbirth, injury, surgery, chronic cough, or lack of exercise. If the pelvic floor is weak, your bladder and other pelvic organs may sag out of place. The urethra may also open too easily and allow urine to leak out. Kegel exercises can help you strengthen your pelvic floor muscles. Then they can better support the pelvic organs and control urine flow.   How Kegel exercises are done  Try each of the Kegel exercises described below. When you’re doing them, try not to move your leg, buttock, or stomach muscles:   Contract as if you were stopping your urine stream. But do it when you’re not urinating.  Tighten your rectum as if trying not to pass gas. Contract your anus, but don’t move your buttocks.  You may place a finger or 2 in the vagina and squeeze your finger with your vagina to learn which muscles to tighten.  Try to hold each Kegel for a slow  count to 5. You probably won’t be able to hold them for that long at first. But keep practicing. It will get easier as your pelvic floor gets stronger. Eventually, special weights that you place in your vagina may be recommended to help make your Kegels even more effective. Talk to your healthcare provider if you have trouble doing Kegel exercises.   Helpful tips  Here are some tips to follow:  Do your Kegels as often as you can. The more you do them, the faster you’ll feel the results.  Pick an activity you do often as a reminder. For instance, do your Kegels every time you sit down.  Tighten your pelvic floor before you sneeze, get up from a chair, cough, laugh, or lift. This can help prevent urine, gas, or stool leakage.  [employer] last reviewed this educational content on 8/1/2020 © 2000-2020 [employer]. [address] [place] [address]. All rights reserved. This information is not intended as a substitute for professional medical care. Always follow your healthcare professional's instructions.   Birth Control Methods  Birth control methods are used to help prevent pregnancy. There are many different methods to choose from. Talk to your healthcare provider about which method is right for you. Be sure to ask your provider about the effectiveness of each method. Also ask about the benefits, risks, and side effects of each method.  Hormones  Some birth control methods work by releasing hormones such as progestin and estrogen. These methods include hormone implants, hormone shots, the vaginal ring, the patch, and birth control pills. They all work by stopping ovulation (release of the egg from the ovary). The implant is a small device that needs to be placed in the upper arm by a trained healthcare provider. It works for up to 3 years. Hormone injections must be repeated every 3 months. The vaginal ring must be replaced monthly (it can be removed during the fourth week of each cycle). The  patch must be replaced weekly (it is not worn during the fourth week of each cycle). Birth control pills must be taken every day. All of these methods are effective and can be stopped at any time.  Intrauterine device (IUD)  An IUD is a small, T-shaped device. It must be placed in the uterus by a trained healthcare provider. There are different types of IUDs available. They work by causing changes in the uterus that make it harder for sperm to reach the egg. Depending on the type of IUD you have, it may work for several years or longer. The IUD is a reversible birth control method. This means it can be removed at any time.  Condom  A condom is a sheath that forms a thin barrier between the penis and the vagina. It helps prevent pregnancy by keeping sperm from entering the vagina. When latex condoms are used, they have the added benefit of protecting against most STIs (sexually transmitted infections). Condoms are used each time there is sexual intercourse and should be discarded after each use. Ask your healthcare provider about the different types of condoms available. These include both the male condom and female condom.  Spermicide  Spermicides come as foams, jellies, creams, suppositories, and tablets.  They help prevent pregnancy by killing sperm. When used alone they are not that reliable. They work best when combined with other birth control methods such as diaphragms and cervical caps.  Sponge, diaphragm, and cervical cap  All of these methods help prevent pregnancy by covering the opening of the uterus (cervix). This prevents sperm from passing through.  The sponge contains spermicide. It can be bought over the counter. The sponge must be left in place for at least 6 hours after the last time you have sex. However, it should not stay in place for more than 24 hours. It should be discarded after it is used.  The diaphragm and cervical cap must be fitted and prescribed by your healthcare provider. Both are  used with spermicide. The diaphragm must be left in place for at least 6 hours after sex. However, it should not stay in place for more than 24 hours. It can be washed and reused. The cervical cap must be left in place for at least 6 hours after sex. However, it should not stay in place for more than 48 hours. It can be washed and reused.  Withdrawal method  This is when the man pulls his penis out of the vagina just before ejaculation (“coming”). This lowers the amount of sperm entering the vagina. Be aware that fluids released just before ejaculation often still contain some sperm, so this method is not as reliable as certain other methods.  Rhythm method  This method requires that you know when in your menstrual cycle you are likely to become pregnant. Then, you avoid sex during those days. This requires careful planning and good discipline. Your healthcare provider can explain more about how this works.  Tubal ligation and vasectomy  These are surgical methods to prevent pregnancy. Tubal ligation is an option for women. The fallopian tubes are blocked or cut (ligated). This keeps the egg from passing into the uterus or sperm from reaching the egg. Vasectomy is an option for men. The tubes that normally carry sperm to the penis are either closed or blocked. Both tubal ligation and vasectomy are permanent birth control methods. This means reversal is either not possible or unlikely to work. They are good choices for women and men who know that they do not want to have children in the future.  [employer] last reviewed this educational content on 11/1/2017  © 9557-1285 [employer]. [address] [place] [address], [place] [address]. All rights reserved. This information is not intended as a substitute for professional medical care. Always follow your healthcare professional's instructions.        How Birth Control Works  Birth control prevents pregnancy by preventing conception. Some methods prevent an egg from  maturing. Some keep the sperm and egg from meeting. And some methods work in both ways.   Preventing ovulation  Certain hormones help prevent an egg from maturing and being released. Hormone methods include:   Birth control pills  Skin patches  Contraceptive vaginal rings  Injections  Preventing sperm and egg from meeting  Methods that prevent the sperm and egg from joining include:  Barrier methods, such as the condom, the diaphragm, and the cervical cap  Spermicide  The IUD (intrauterine device)  Sterilization  Natural family planning  Some types of hormone methods  [employer] last reviewed this educational content on 4/1/2020 © 2000-2020 [employer]. [address] [place] [id number]. All rights reserved. This information is not intended as a substitute for professional medical care. Always follow your healthcare professional's instructions.        Birth Control: Time-Release Hormones     Time-release hormones require a doctor's prescription.   Certain hormones can help prevent pregnancy. Hormones like the ones used in birth control pills can be taken in other forms. These must be prescribed by your healthcare provider. Because there’s very little for you to do, you may find one of these methods easier to stick to than pills. Side effects for this method will vary depending on the type of time-release hormone you use. Talk to your healthcare provider for more information.   Pregnancy rates  Talk to your healthcare provider about the effectiveness of this birth control method.  Using time-release hormones  Methods to deliver hormones include:  A skin patch placed on your stomach, buttocks, arm, or shoulder. You replace the patch weekly.  A ring that you insert in your vagina, leave in for 3 weeks, and remove for 1 week.  Injections given in your arm or buttocks once every 3 months by your healthcare provider.  An implant placed under the skin in the upper arm by your healthcare provider. This  can be left in place for up to 3 years.  The progestin IUDs placed by your healthcare provider. These can be left in place for 3 to 5 years depending on which one is chosen.  Pros  Lowest pregnancy rate of the birth control methods that can be reversed  No interruption to sex  Easy to use  Don’t require taking a pill each day  May decrease menstrual cramps, menstrual flow, and acne    Cons  Don't protect against sexually transmitted infections (STIs)  May cause irregular periods  May cause side effects such as nausea, weight gain, headaches, breast tenderness, fatigue, or mood changes (these often go away within 3 months)  May take up to a year for you to become fertile (able to get pregnant) after stopping injections  May increase the risk of blood clots, heart attack, and stroke    Time-release hormones may not be for you  Time-release hormones may not be for you if:  You are a smoker and over age 35  You have high blood pressure, gallbladder disease, liver disease, certain lipid disorders, cerebrovascular disease (stroke), or heart disease  You have diabetes, migraines, clot in a vein or artery (thromboembolic disorder), lupus, or take medicines that may interfere with the hormones  In these cases, discuss the risks with your healthcare provider.  [employer] last reviewed this educational content on 4/1/2020  © 8207-4633 [employer]. [address] [place] [address], [place] [address]. All rights reserved. This information is not intended as a substitute for professional medical care. Always follow your healthcare professional's instructions.        Birth Control: Natural Family Planning     To use NFP, you keep daily records of the signs that show when you are fertile.   Natural family planning (NFP) is based on a woman's awareness of when she is likely to become pregnant (fertile). By learning how to tell when you're fertile, you can know when to not have sex. This can help prevent pregnancy. To learn NFP,  it's advised that you take a class or work with a qualified teacher.   Pregnancy rates  Talk to your healthcare provider about how well this birth control method works.   Using NFP  A woman is fertile only during a certain part of her monthly cycle--just before and during ovulation. By learning when you ovulate, you can know when you're likely to be fertile. You estimate when you're ovulating by observing and keeping track of certain physical signs. You can then avoid sex or use a barrier method during that fertile time. But be aware that each woman's cycle and signs are different, and no woman's cycle is perfectly regular.   Pros  Both partners share responsibility  No known health risks  No side effects  Is inexpensive or free  If you abstain from sex during fertile periods, this method is approved by all [employer] communities  Easy to stop if you decide you want to become pregnant    Cons  Takes time to learn  Requires daily observation and charting  Requires abstaining from sex or using a barrier method during fertile periods (nine or more days per month)  Does not protect against sexually transmitted infections (STIs)    When natural family planning may not be for you  Natural family planning may not be for you if:  You don't have the full cooperation of your partner  You haven't received training from a qualified teacher  Your periods are not regular  You take certain medicines or should not get pregnant due to a medical condition  You just started having periods or you are approaching menopause  [employer] last reviewed this educational content on 5/1/2020  © 4502-3351 [employer], [employer]. [address] [place] [address], [place] [address]. All rights reserved. This information is not intended as a substitute for professional medical care. Always follow your healthcare professional's instructions.        Birth Control: IUD (Intrauterine Device)    The IUD (intrauterine device) is small, flexible, and T-shaped. A  trained healthcare provider places it in the uterus. The IUD is one of the most effective birth control methods. It's also reversible. This means it can be removed at any time by a trained healthcare provider. New IUDs are safe and don't have the risks of older types of IUDs.   Pregnancy rates  Talk to your healthcare provider about the effectiveness of this birth control method.  Types of IUDs  IUD insertion is done in the healthcare provider’s office. Two types of IUDs are available:  The copper IUD releases a small amount of copper into the uterus. The copper makes it harder for sperm to reach the egg. The device works for at least 10 years.  The progestin IUD releases a hormone called progestin. It causes changes in the uterus to help prevent pregnancy. The device works for 3 to 5 years, depending on which device is chosen. It may be recommended for women who have anemia or heavy and painful periods.  IUDs have thin strings that hang from the opening of the uterus into the vagina. This lets you check that the IUD stays in place.   Things to know about IUDs  IUDs can be used by women who have never been pregnant or by women with a history of sexually transmitted infections (STIs) or tubal pregnancy.  It won't move from the uterus to any other part of the body.  There is a slight risk of the device coming out of the vagina (expulsion).  It may not work in women who have an abnormally shaped uterus.  A copper IUD may cause heavier periods and cramping.  Progestin IUD may cause light periods or no periods at all (irregular bleeding or spotting is possible and normal during first 3 to 6 months).  If you get a sexually transmitted infection with an IUD in place, symptoms may be more severe.    What to report to your healthcare provider  Be sure your healthcare provider knows if you have:  A sexually transmitted infection (STI) or possible STI  Liver problems  Blood clots (for progestin IUD only)  Breast cancer or a  history of breast cancer (progestin IUD only)  [employer] last reviewed this educational content on 5/1/2020 © 2000-2020 [email or web address]. [address] [place] [address], [place] [address]. All rights reserved. This information is not intended as a substitute for professional medical care. Always follow your healthcare professional's instructions.        Levonorgestrel intrauterine device (IUD)  Brand Names: Kyleena, LILETTA, Mirena, Beryl  What is this medicine?  LEVONORGESTREL IUD (BRIDGETT voe nor marquita trel) is a contraceptive (birth control) device. The device is placed inside the uterus by a healthcare professional. It is used to prevent pregnancy. This device can also be used to treat heavy bleeding that occurs during your period.  How should I use this medicine?  This device is placed inside the uterus by a health care professional.  Talk to your pediatrician regarding the use of this medicine in children. Special care may be needed.  What side effects may I notice from receiving this medicine?  Side effects that you should report to your doctor or health care professional as soon as possible:  allergic reactions like skin rash, itching or hives, swelling of the face, lips, or tongue  fever, flu-like symptoms  genital sores  high blood pressure  no menstrual period for 6 weeks during use  pain, swelling, warmth in the leg  pelvic pain or tenderness  severe or sudden headache  signs of pregnancy  stomach cramping  sudden shortness of breath  trouble with balance, talking, or walking  unusual vaginal bleeding, discharge  yellowing of the eyes or skin  Side effects that usually do not require medical attention (report to your doctor or health care professional if they continue or are bothersome):  acne  breast pain  change in sex drive or performance  changes in weight  cramping, dizziness, or faintness while the device is being inserted  headache  irregular menstrual bleeding within first 3 to 6 months of  use  nausea  What may interact with this medicine?  Do not take this medicine with any of the following medications:  amprenavir  bosentan  fosamprenavir  This medicine may also interact with the following medications:  aprepitant  armodafinil  barbiturate medicines for inducing sleep or treating seizures  bexarotene  boceprevir  griseofulvin  medicines to treat seizures like carbamazepine, ethotoin, felbamate, oxcarbazepine, phenytoin, topiramate  modafinil  pioglitazone  rifabutin  rifampin  rifapentine  some medicines to treat HIV infection like atazanavir, efavirenz, indinavir, lopinavir, nelfinavir, tipranavir, ritonavir  [place]. Martin's wort  warfarin  What if I miss a dose?  This does not apply. Depending on the brand of device you have inserted, the device will need to be replaced every 3 to 6 years if you wish to continue using this type of birth control.  Where should I keep my medicine?  This does not apply.  What should I tell my health care provider before I take this medicine?  They need to know if you have any of these conditions:  abnormal Pap smear  cancer of the breast, uterus, or cervix  diabetes  endometritis  genital or pelvic infection now or in the past  have more than one sexual partner or your partner has more than one partner  heart disease  history of an ectopic or tubal pregnancy  immune system problems  IUD in place  liver disease or tumor  problems with blood clots or take blood-thinners  seizures  use intravenous drugs  uterus of unusual shape  vaginal bleeding that has not been explained  an unusual or allergic reaction to levonorgestrel, other hormones, silicone, or polyethylene, medicines, foods, dyes, or preservatives  pregnant or trying to get pregnant  breast-feeding  What should I watch for while using this medicine?  Visit your doctor or health care professional for regular check ups. See your doctor if you or your partner has sexual contact with others, becomes HIV positive, or  gets a sexual transmitted disease.  This product does not protect you against HIV infection (AIDS) or other sexually transmitted diseases.  You can check the placement of the IUD yourself by reaching up to the top of your vagina with clean fingers to feel the threads. Do not pull on the threads. It is a good habit to check placement after each menstrual period. Call your doctor right away if you feel more of the IUD than just the threads or if you cannot feel the threads at all.  The IUD may come out by itself. You may become pregnant if the device comes out. If you notice that the IUD has come out use a backup birth control method like condoms and call your health care provider.  Using tampons will not change the position of the IUD and are okay to use during your period.  This IUD can be safely scanned with magnetic resonance imaging (MRI) only under specific conditions. Before you have an MRI, tell your healthcare provider that you have an IUD in place, and which type of IUD you have in place.  NOTE:This sheet is a summary. It may not cover all possible information. If you have questions about this medicine, talk to your doctor, pharmacist, or health care provider. Copyright© 2020 [employer]        Etonogestrel implant  What is this medicine?  ETONOGESTREL (et oh mihaela JACOB trel) is a contraceptive (birth control) device. It is used to prevent pregnancy. It can be used for up to 3 years.  How should I use this medicine?  This device is inserted just under the skin on the inner side of your upper arm by a health care professional.  Talk to your pediatrician regarding the use of this medicine in children. Special care may be needed.  What side effects may I notice from receiving this medicine?  Side effects that you should report to your doctor or health care professional as soon as possible:  allergic reactions like skin rash, itching or hives, swelling of the face, lips, or tongue  breast lumps  changes in emotions or  moods  depressed mood  heavy or prolonged menstrual bleeding  pain, irritation, swelling, or bruising at the insertion site  scar at site of insertion  signs of infection at the insertion site such as fever, and skin redness, pain or discharge  signs of pregnancy  signs and symptoms of a blood clot such as breathing problems; changes in vision; chest pain; severe, sudden headache; pain, swelling, warmth in the leg; trouble speaking; sudden numbness or weakness of the face, arm or leg  signs and symptoms of liver injury like dark yellow or brown urine; general ill feeling or flu-like symptoms; light-colored stools; loss of appetite; nausea; right upper belly pain; unusually weak or tired; yellowing of the eyes or skin  unusual vaginal bleeding, discharge  signs and symptoms of a stroke like changes in vision; confusion; trouble speaking or understanding; severe headaches; sudden numbness or weakness of the face, arm or leg; trouble walking; dizziness; loss of balance or coordination  Side effects that usually do not require medical attention (report to your doctor or health care professional if they continue or are bothersome):  acne  back pain  breast pain  changes in weight  dizziness  general ill feeling or flu-like symptoms  headache  irregular menstrual bleeding  nausea  sore throat  vaginal irritation or inflammation  What may interact with this medicine?  Do not take this medicine with any of the following medications:  amprenavir  fosamprenavir  This medicine may also interact with the following medications:  acitretin  aprepitant  armodafinil  bexarotene  bosentan  carbamazepine  certain medicines for fungal infections like fluconazole, ketoconazole, itraconazole and voriconazole  certain medicines to treat hepatitis, HIV or AIDS  cyclosporine  felbamate  griseofulvin  lamotrigine  modafinil  oxcarbazepine  phenobarbital  phenytoin  primidone  rifabutin  rifampin  rifapentine  [facility]  wort  topiramate  What if I miss a dose?  This does not apply.  Where should I keep my medicine?  This drug is given in a hospital or clinic and will not be stored at home.  What should I tell my health care provider before I take this medicine?  They need to know if you have any of these conditions:  abnormal vaginal bleeding  blood vessel disease or blood clots  breast, cervical, endometrial, ovarian, liver, or uterine cancer  diabetes  gallbladder disease  heart disease or recent heart attack  high blood pressure  high cholesterol or triglycerides  kidney disease  liver disease  migraine headaches  seizures  stroke  tobacco smoker  an unusual or allergic reaction to etonogestrel, anesthetics or antiseptics, other medicines, foods, dyes, or preservatives  pregnant or trying to get pregnant  breast-feeding  What should I watch for while using this medicine?  This product does not protect you against HIV infection (AIDS) or other sexually transmitted diseases.  You should be able to feel the implant by pressing your fingertips over the skin where it was inserted. Contact your doctor if you cannot feel the implant, and use a non-hormonal birth control method (such as condoms) until your doctor confirms that the implant is in place. Contact your doctor if you think that the implant may have broken or become bent while in your arm.  You will receive a user card from your health care provider after the implant is inserted. The card is a record of the location of the implant in your upper arm and when it should be removed. Keep this card with your health records.  NOTE:This sheet is a summary. It may not cover all possible information. If you have questions about this medicine, talk to your doctor, pharmacist, or health care provider. Copyright© 2020 [employer]

## 2024-09-13 NOTE — PROGRESS NOTES
Chief Complaint   Patient presents with    Postpartum Care     Female 3lb 3.9oz premature,  c- section, bump in upper back area,    NEERAJ-7 score 14         HPI:   Xu is 34 year old , here today for 6-week postpartum visit.  Type and date of Delivery:  2024, Complications: fetal intolerance to labor  See Delivery Summary for baby's gender and weight  Perineum: Intact  Feeding Method: formula/breastfeeding  Bonding: well  Maternal sleep: 2-3 hours at a time   Sexual activity resumed: No. Contraceptive Method: declines at this time  Postpartum Depression screen: 20    Reports stress and concern due to living situation, baby's health and wanting to follow Dr. Morales recommendations to remain in [place] for [place] months due to prematurity but  not being able to work and not being able to get resources is stressful.  would like to travel back to [place] now due to no source of income.     Pt also concerned about a small bump on right side of abdomen that is tender to touch, reports having a bump previously that was removed in [place] pre-pregnancy, pt unsure when exactly.     MASOOD Rock present in the room for cervical exam    HISTORY:  Patient Active Problem List   Diagnosis    History of  death    Slow transit constipation    Prediabetes    Anemia in pregnancy, third trimester (HCC)    History of pre-eclampsia    Acute blood loss anemia    Postpartum care following  delivery ([facility])    Postpartum depression    Stress and adjustment reaction       Medications (Active prior to today's visit):  Current Outpatient Medications   Medication Sig Dispense Refill    ibuprofen 600 MG Oral Tab Take 1 tablet (600 mg total) by mouth every 6 (six) hours. 60 tablet 0    NIFEdipine ER 30 MG Oral Tablet 24 Hr Take 1 tablet (30 mg total) by mouth daily. 30 tablet 2    Ferrous Sulfate 325 (65 Fe) MG Oral Tab Take 1 tablet (325 mg total) by mouth every other day. 30  tablet 1    prenatal vitamin with DHA 27-0.8-228 MG Oral Cap Take 1 capsule by mouth daily.      gabapentin 300 MG Oral Cap Take 1 capsule (300 mg total) by mouth 3 (three) times daily as needed (moderate surgical pain). (Patient not taking: Reported on 2024) 30 capsule 0       Allergies:   No Known Allergies    PHYSICAL EXAM:   Vitals:    24 0914   BP: 108/76   Pulse: 82       Pre-pregnancy 29.17  Today's Body mass index is 32.5 kg/m².    Pre-pregnancy weight 170 lbs  Today's weight 172 lbs    Physical Exam  Vitals reviewed.   Constitutional:       Appearance: Normal appearance.   HENT:      Head: Normocephalic.   Pulmonary:      Effort: Pulmonary effort is normal.   Abdominal:      Comments: RUQ near back; 1.5 in red bump that is tender    Rectus abdominus muscles well approximated no diastasis  Uterus palpates within the pelvis by abdominal exam     scar healing. 2 locations where the top layer of skin is healing by second intension. Edges approximated in normal positions. Very moist     Genitourinary:     General: Normal vulva.      Vagina: Bleeding present.      Cervix: Normal.      Uterus: Not deviated and not enlarged.       Comments:     Neurological:      Mental Status: She is alert and oriented to person, place, and time.   Psychiatric:         Behavior: Behavior normal.         Thought Content: Thought content normal.         Judgment: Judgment normal.             ASSESSMENT/PLAN:   Xu was seen today for postpartum care.    Diagnoses and all orders for this visit:    Postpartum care and examination (HCC)    Pap smear for cervical cancer screening  -     ThinPrep PAP Smear; Future  -     Hpv Dna  High Risk , Thin Prep Collect; Future  -     ThinPrep PAP Smear  -     Hpv Dna  High Risk , Thin Prep Collect    Postpartum depression  -     [facility] - INTERNAL    Stress and adjustment reaction  -     [facility] - INTERNAL       - C/S scar: recommended  dry pad to the area during the day to remove moisture. Will monitor again in 2 weeks for further healing  -Referral to PCP to evaluate small tender bump on right abdominal    Next annual due: 3 months  Follow-up/Return to clinic: 2 weeks    Counseling:   Postpartum teaching including maternal and family adaptation, sleep/rest strategies, lactation and weaning (if applicable), reaching ideal body weight, sexuality/contraception, importance of Kegel's and abdominal exercises, continuation of prenatal vitamins, and signs/symptoms of postpartum depression  Patient verbalized understanding, All questions answered. No barriers to learning identified    JOSUE Gardiner under direct supervision of Lindy Berg CNM    Patient seen in conjunction with MASOOD. I personally witnessed the patient's exam and medical decision making on this date of service.  I was physically present in the room for the performance of the E/M service.  I have reviewed the CHRIS student's documentation and findings including history, Exam, and Medical Decision Making, edited the document for accuracy and verify that it represents the clinical findings and services performed.

## 2024-09-16 LAB — HPV E6+E7 MRNA CVX QL NAA+PROBE: NEGATIVE

## 2024-09-18 ENCOUNTER — TELEPHONE (OUTPATIENT)
Dept: OBGYN CLINIC | Facility: CLINIC | Age: 34
End: 2024-09-18

## 2024-09-18 ENCOUNTER — TELEPHONE (OUTPATIENT)
Age: 34
End: 2024-09-18

## 2024-09-18 NOTE — TELEPHONE ENCOUNTER
----- Message from Lindy Berg sent at 9/18/2024  5:10 PM CDT -----  No active MyChart. Please phone pt and inform her of normal pap results shown below w/ plan for repeating in 5 years.     Also, please notify her that we would like to see her back in the office in 2-4 weeks for follow-up on her mood.    Thanks,    Lindy.

## 2024-09-18 NOTE — TELEPHONE ENCOUNTER
The [facility] Navigation team has attempted to reach you in order to follow up on an order that was placed by Lindy Berg's office. Please give us a call back at [phone] to discuss care coordination and resources.

## 2024-09-19 NOTE — TELEPHONE ENCOUNTER
Pt called and informed of results and provider recommendations. Pt voices understanding. Pt scheduled for her follow up visit with Lindy on 10/09/24 at 8:15 am.

## 2024-09-19 NOTE — TELEPHONE ENCOUNTER
Patient paged service. Returned phone call to number provided. Her  answered and asked that we call tomorrow. States Xu is not with him now. Advised she paged us that she wanted to talk to us but he said she will call back or we can call her tomorrow.

## 2024-09-19 NOTE — TELEPHONE ENCOUNTER
Patient paged service again. Returned call. Notified nurse called to tell her pap smear was normal. Also Lindy would like her to have f/u visit in 2-4 wks to f/u on her mood. To call clinic in morning to schedule.

## 2024-10-04 ENCOUNTER — TELEPHONE (OUTPATIENT)
Age: 34
End: 2024-10-04

## 2024-11-15 ENCOUNTER — MOBILE ENCOUNTER (OUTPATIENT)
Dept: OBGYN CLINIC | Facility: CLINIC | Age: 34
End: 2024-11-15

## 2024-11-15 RX ORDER — SERTRALINE HYDROCHLORIDE 25 MG/1
TABLET, FILM COATED ORAL
Qty: 49 TABLET | Refills: 0 | OUTPATIENT
Start: 2024-11-15

## (undated) DEVICE — POWDER HEMSTAT 3GM OXIDIZED REGENERATED CELOS

## (undated) DEVICE — ADHESIVE SKIN CLSR 0.7ML TOP DERMBND ADV

## (undated) DEVICE — 3M™ MEDIPORE™ H SOFT CLOTH SURGICAL TAPE 2864, 4 INCH X 10 YARD (10CM X 9,14M), 12 ROLLS/CASE: Brand: 3M™ MEDIPORE™

## (undated) NOTE — LETTER
24          RE:  Xu Martinez                : 1990      To Whom It May Concern:    This letter is to inform you that Xu Martinez underwent a  section delivery on 2024 at 34 weeks for fetal distress and severe preeclampsia while visiting the [place] from [place]. The patient and her  are unable to return to their home country for 6 months due to the prematurity of the infant and need for close follow up and care.     If you have any questions concerning this letter, please do not hesitate to contact my office.    Sincerely yours,        Angie Pompa MD

## (undated) NOTE — LETTER
VACCINE ADMINISTRATION RECORD  PARENT / GUARDIAN APPROVAL  Date: 2024  Vaccine administered to: Xu Martinez     : 1990    MRN: [id number]    A copy of the appropriate Centers for Disease Control and Prevention Vaccine Information statement has been provided. I have read or have had explained the information about the diseases and the vaccines listed below. There was an opportunity to ask questions and any questions were answered satisfactorily. I believe that I understand the benefits and risks of the vaccine cited and ask that the vaccine(s) listed below be given to me or to the person named above (for whom I am authorized to make this request).    VACCINES ADMINISTERED:  Tdap    I have read and hereby agree to be bound by the terms of this agreement as stated above. My signature is valid until revoked by me in writing.  This document is signed by self, relationship: Self on 2024.:                                                                                                                                         Parent / Guardian Signature                                                Date    Josie CHOWDARY LPN served as a witness to authentication that the identity of the person signing electronically is in fact the person represented as signing.    This document was generated by Josie CHOWDARY LPN on 2024.

## (undated) NOTE — LETTER
[facility]  Administration of Anesthesia  Xu ROSENTHAL agree to be cared for by a physician anesthesiologist alone and/or with a nurse anesthetist, who is specially trained to monitor me and give me medicine to put me to sleep or keep me comfortable during my procedure    I understand that my anesthesiologist and/or anesthetist is not an employee or agent of [facility] or [employer]. He or she works for [facility]    As the patient asking for anesthesia services, I agree to:  Allow the anesthesiologist (anesthesia doctor) to give me medicine and do additional procedures as necessary. Some examples are: Starting or using an “IV” to give me medicine, fluids or blood during my procedure, and having a breathing tube placed to help me breathe when I’m asleep (intubation). In the event that my heart stops working properly, I understand that my anesthesiologist will make every effort to sustain my life, unless otherwise directed by [facility] Do Not Resuscitate documents.  Tell my anesthesia doctor before my procedure:  If I am pregnant.  The last time that I ate or drank.  iii. All of the medicines I take (including prescriptions, herbal supplements, and pills I can buy without a prescription (including street drugs/illegal medications). Failure to inform my anesthesiologist about these medicines may increase my risk of anesthetic complications.  iv.If I am allergic to anything or have had a reaction to anesthesia before.  I understand how the anesthesia medicine will help me (benefits).  I understand that with any type of anesthesia medicine there are risks:  The most common risks are: nausea, vomiting, sore throat, muscle soreness, damage to my eyes, mouth, or teeth (from breathing tube placement).  Rare risks include: remembering what happened during my procedure, allergic reactions to medications, injury to my airway, heart, lungs, vision, nerves, or  muscles and in extremely rare instances death.  My doctor has explained to me other choices available to me for my care (alternatives).  Pregnant Patients (“epidural”):  I understand that the risks of having an epidural (medicine given into my back to help control pain during labor), include itching, low blood pressure, difficulty urinating, headache or slowing of the baby’s heart. Very rare risks include infection, bleeding, seizure, irregular heart rhythms and nerve injury.  Regional Anesthesia (“spinal”, “epidural”, & “nerve blocks”):  I understand that rare but potential complications include headache, bleeding, infection, seizure, irregular heart rhythms, and nerve injury.    _____________________________________________________________________________  Patient (or Representative) Signature/Relationship to Patient  Date   Time    _____________________________________________________________________________   Name (if used)    Language/Organization   Time    _____________________________________________________________________________  Nurse Anesthetist Signature     Date   Time  _____________________________________________________________________________  Anesthesiologist Signature     Date   Time  I have discussed the procedure and information above with the patient (or patient’s representative) and answered their questions. The patient or their representative has agreed to have anesthesia services.    _____________________________________________________________________________  Witness        Date   Time  I have verified that the signature is that of the patient or patient’s representative, and that it was signed before the procedure  Patient Name: Xu Martinez     : 1990                 Printed: 2024 at 1:07 PM    Medical Record #: [id number]                                            Page 1 of 1  ----------ANESTHESIA CONSENT----------

## (undated) NOTE — Clinical Note
Continue care with this [place] She was advised to limit total gestational weight gain to less than 20 pounds Weekly NST at 36 weeks  echocardiogram prior to hospital discharge

## (undated) NOTE — LETTER
24          RE:  Xu Martienz                : 1990      To Whom It May Concern:    This letter is to inform you that Xu Martinez underwent a  section delivery on 2024 at 34 weeks for fetal distress and severe preeclampsia while visiting the [place] from [place]. The patient and her  are unable to return to their home country for 6 months due to the prematurity of the infant and need for close follow up and care. It would help significantly if her  could receive a work permit so he could support his family financially at this time.     If you have any questions concerning this letter, please do not hesitate to contact my office.    Sincerely yours,        Angie Pompa MD